# Patient Record
Sex: FEMALE | Race: WHITE
[De-identification: names, ages, dates, MRNs, and addresses within clinical notes are randomized per-mention and may not be internally consistent; named-entity substitution may affect disease eponyms.]

---

## 2019-04-04 ENCOUNTER — HOSPITAL ENCOUNTER (EMERGENCY)
Dept: HOSPITAL 56 - MW.ED | Age: 19
Discharge: HOME | End: 2019-04-04
Payer: COMMERCIAL

## 2019-04-04 VITALS — SYSTOLIC BLOOD PRESSURE: 125 MMHG | DIASTOLIC BLOOD PRESSURE: 75 MMHG

## 2019-04-04 DIAGNOSIS — B34.9: Primary | ICD-10-CM

## 2019-04-04 DIAGNOSIS — Z79.899: ICD-10-CM

## 2019-04-04 NOTE — EDM.PDOC
ED HPI GENERAL MEDICAL PROBLEM





- General


Chief Complaint: Respiratory Problem


Stated Complaint: FLU


Time Seen by Provider: 04/04/19 10:55


Source of Information: Reports: Patient


History Limitations: Reports: No Limitations





- History of Present Illness


INITIAL COMMENTS - FREE TEXT/NARRATIVE: 


HISTORY AND PHYSICAL:





History of present illness:


Patient is a 18-year-old female who presents to the ED today with concern of 

sore throat, nausea, and malaise starting today. Patient states that she is 

approximately 20 weeks pregnant. She states she is here because of concern that 

she needs to get treated for influenza. She has been taking Tylenol for her 

symptoms. She states she also has a slight headache but rates her pain a 2/10 

and describes it as more of a "discomfort." She follows OBGYN care at [facility].





Patient denies fever, chills, shortness of breath, or cough. Denies neck stiff 

ness, change in vision, syncope, or near syncope. Denies vomiting, abdominal 

pain, diarrhea, constipation, or dysuria. Has not noted any blood in urine or 

stool. Patient has been eating and drinking appropriately.





Review of systems: 


As per history of present illness and below otherwise all systems reviewed and 

negative.





Past medical history: 


As per history of present illness and as reviewed below otherwise 

noncontributory.





Surgical history: 


As per history of present illness and as reviewed below otherwise 

noncontributory.





Social history: 


See social history for further information





Family history: 


As per history of present illness and as reviewed below otherwise 

noncontributory.





Physical exam:


General: Patient is alert, oriented, and in no acute distress. She is sitting 

comfortably on exam table.


HEENT: Atraumatic, normocephalic, pupils equal and reactive bilaterally, 

negative for conjunctival pallor or scleral icterus, mucous membranes moist, 

TMs normal bilaterally, throat is mildly erythematous without exudate, neck 

supple, nontender, trachea midline. No drooling or trismus noted. No meningeal 

signs. No hot potato voice noted. 


Lungs: Clear to auscultation, breath sounds equal bilaterally, chest nontender.


Heart: S1S2, regular rate and rhythm without overt murmur


Abdomen: Soft, nondistended, nontender. Negative for masses or 

hepatosplenomegaly. Negative for costovertebral tenderness.


Pelvis: Stable nontender.


Genitourinary: Deferred.


Rectal: Deferred.


Skin: Intact, warm, dry. No lesions or rashes noted.


Extremities: Atraumatic, negative for cords or calf pain. Neurovascular 

unremarkable.


Neuro: Awake, alert, oriented. Cranial nerves II through XII unremarkable. 

Cerebellum unremarkable. Motor and sensory unremarkable throughout. Exam 

nonfocal.





Notes:


Will do labs today.


Labs are unremarkable. Discussed these findings with patient. Discussed the 

importance of follow up with her OBGYN and primary care provider. Supportive 

care measures were reviewed and discussed. Voices understanding and is 

agreeable to plan of care. Denies any further questions or concerns at this 

time.





Diagnostics:


Influenza, strep





Therapeutics:


None





Prescription:


None





Impression: 


1. Viral illness, unspecified





Plan:


1. Continue to use Tylenol as directed for pain and discomfort. This is safe to 

use in pregnancy.


2. Use over-the-counter B6 and Unisom for nausea as this is safe to use in 

pregnancy.


3. Follow up with your OB/GYN and primary care provider as discussed.


4. Return to the ED as needed and as discussed.





Definitive disposition and diagnosis as appropriate pending reevaluation and 

review of above.





  ** Thaot


Pain Score (Numeric/FACES): 6





- Related Data


 Allergies











Allergy/AdvReac Type Severity Reaction Status Date / Time


 


No Known Allergies Allergy   Verified 04/04/19 11:03











Home Meds: 


 Home Meds





Omeprazole 20 mg PO DAILY 04/04/19 [History]











Past Medical History





- Past Health History


Medical/Surgical History: Denies Medical/Surgical History


HEENT History: Reports: Other (See Below)





- Infectious Disease History


Infectious Disease History: Reports: None





- Past Surgical History


Head Surgeries/Procedures: Reports: None





Social & Family History





- Family History


Family Medical History: Noncontributory





- Tobacco Use


Smoking Status *Q: Current Every Day Smoker


Years of Tobacco use: 5


Packs/Tins Daily: 0.2





- Caffeine Use


Caffeine Use: Reports: None





- Recreational Drug Use


Recreational Drug Use: No





ED ROS GENERAL





- Review of Systems


Review Of Systems: ROS reveals no pertinent complaints other than HPI.





ED EXAM, GENERAL





- Physical Exam


Exam: See Below (see dictation)





Course





- Vital Signs


Last Recorded V/S: 


 Last Vital Signs











Temp  37.1 C   04/04/19 11:04


 


Pulse  122 H  04/04/19 11:04


 


Resp  16   04/04/19 11:04


 


BP  127/79   04/04/19 11:04


 


Pulse Ox  98   04/04/19 11:04














- Orders/Labs/Meds


Orders: 


 Active Orders 24 hr











 Category Date Time Status


 


 CULTURE STREP A CONFIRMATION [] Stat Lab  04/04/19 10:55 Results


 


 STREP SCRN A RAPID W CULT CONF [] Stat Lab  04/04/19 10:55 Results














Departure





- Departure


Time of Disposition: 11:46


Disposition: Home, Self-Care 01


Clinical Impression: 


 Viral illness








- Discharge Information


Referrals: 


PCP,None [Primary Care Provider] - 


Forms:  ED Department Discharge


Additional Instructions: 


The following information is given to patients seen in the emergency department 

who are being discharged to home. This information is to outline your options 

for follow-up care. We provide all patients seen in our emergency department 

with a follow-up referral.





The need for follow-up, as well as the timing and circumstances, are variable 

depending upon the specifics of your emergency department visit.





If you don't have a primary care physician on staff, we will provide you with a 

referral. We always advise you to contact your personal physician following an 

emergency department visit to inform them of the circumstance of the visit and 

for follow-up with them and/or the need for any referrals to a consulting 

specialist.





The emergency department will also refer you to a specialist when appropriate. 

This referral assures that you have the opportunity for follow-up care with a 

specialist. All of these measure are taken in an effort to provide you with 

optimal care, which includes your follow-up.





Under all circumstances we always encourage you to contact your private 

physician who remains a resource for coordinating your care. When calling for 

follow-up care, please make the office aware that this follow-up is from your 

recent emergency room visit. If for any reason you are refused follow-up, 

please contact the [facility] Emergency 

Department at [phone] and asked to speak to the emergency department 

charge nurse.





[facility]


Primary Care


[address] [place] [id number]


Phone: [phone]


Fax: [phone]





[address] [address]


Phone: [phone]


Fax: [phone]





1. Continue to use Tylenol as directed for pain and discomfort. This is safe to 

use in pregnancy.


2. Use over-the-counter B6 and Unisom for nausea as this is safe to use in 

pregnancy.


3. Follow up with your OB/GYN and primary care provider as discussed.


4. Return to the ED as needed and as discussed.





 








- My Orders


Last 24 Hours: 


My Active Orders





04/04/19 10:55


CULTURE STREP A CONFIRMATION [RM] Stat 


STREP SCRN A RAPID W CULT CONF [RM] Stat 














- Assessment/Plan


Last 24 Hours: 


My Active Orders





04/04/19 10:55


CULTURE STREP A CONFIRMATION [RM] Stat 


STREP SCRN A RAPID W CULT CONF [RM] Stat

## 2019-04-06 ENCOUNTER — HOSPITAL ENCOUNTER (OUTPATIENT)
Dept: HOSPITAL 56 - MW.OBCHECK | Age: 19
Setting detail: OBSERVATION
LOS: 1 days | Discharge: HOME | End: 2019-04-07
Attending: OBSTETRICS & GYNECOLOGY | Admitting: OBSTETRICS & GYNECOLOGY
Payer: COMMERCIAL

## 2019-04-06 DIAGNOSIS — Z3A.23: ICD-10-CM

## 2019-04-06 DIAGNOSIS — O99.612: Primary | ICD-10-CM

## 2019-04-06 DIAGNOSIS — O99.89: ICD-10-CM

## 2019-04-06 DIAGNOSIS — K52.9: ICD-10-CM

## 2019-04-06 DIAGNOSIS — R79.89: ICD-10-CM

## 2019-04-06 LAB
CHLORIDE SERPL-SCNC: 102 MMOL/L (ref 98–107)
SODIUM SERPL-SCNC: 135 MMOL/L (ref 136–145)

## 2019-04-06 PROCEDURE — 82150 ASSAY OF AMYLASE: CPT

## 2019-04-06 PROCEDURE — 96361 HYDRATE IV INFUSION ADD-ON: CPT

## 2019-04-06 PROCEDURE — 81001 URINALYSIS AUTO W/SCOPE: CPT

## 2019-04-06 PROCEDURE — 85025 COMPLETE CBC W/AUTO DIFF WBC: CPT

## 2019-04-06 PROCEDURE — 96375 TX/PRO/DX INJ NEW DRUG ADDON: CPT

## 2019-04-06 PROCEDURE — 96374 THER/PROPH/DIAG INJ IV PUSH: CPT

## 2019-04-06 PROCEDURE — 59025 FETAL NON-STRESS TEST: CPT

## 2019-04-06 PROCEDURE — 76705 ECHO EXAM OF ABDOMEN: CPT

## 2019-04-06 PROCEDURE — 80074 ACUTE HEPATITIS PANEL: CPT

## 2019-04-06 PROCEDURE — 83690 ASSAY OF LIPASE: CPT

## 2019-04-06 PROCEDURE — G0378 HOSPITAL OBSERVATION PER HR: HCPCS

## 2019-04-06 PROCEDURE — 80053 COMPREHEN METABOLIC PANEL: CPT

## 2019-04-06 PROCEDURE — 36415 COLL VENOUS BLD VENIPUNCTURE: CPT

## 2019-04-07 LAB
CHLORIDE SERPL-SCNC: 107 MMOL/L (ref 98–107)
SODIUM SERPL-SCNC: 139 MMOL/L (ref 136–145)

## 2019-04-07 NOTE — US
INDICATION:



Elevated LFTs in a pregnant patient (22 weeks gestation). 



TECHNIQUE:



Ultrasound abdomen limited. Sonographic images of the right upper quadrant 

were obtained using gray-scale and color Doppler images.



COMPARISON:



None 



FINDINGS:



Liver: Normal in size and echotexture. No masses. No intrahepatic biliary 

dilatation. The main portal vein demonstrates hepatopetal flow.



Gallbladder: No stones or sludge. Normal wall thickness. No pericholecystic 

fluid. Negative sonographic Castro`s sign.



Common bile duct: Normal in caliber measuring 2 mm. 



Pancreas: Visualized portions of the head and body are within normal 

limits. Distal body and tail are obscured by overlying bowel gas. 



Right kidney: Measures 12.7 cm. Normal echotexture and cortex. No masses, 

stones, or hydronephrosis. 



IMPRESSION:



Normal right upper quadrant ultrasound.



Dictated by Justina Gamboa MD @ 4/7/2019 10:12:32 AM



Dictated by: Justina Gamboa MD @ 04/07/2019 10:12:36



(Electronically Signed)

## 2019-08-07 NOTE — PCM.PREANE
Preanesthetic Assessment





- Anesthesia/Transfusion/Family Hx


Anesthesia History: Prior Anesthesia Without Reaction


Family History of Anesthesia Reaction: No


Transfusion History: No Prior Transfusion(s)


Type of Transfusion Reactions: Reports: Unknown





- Review of Systems


General: No Symptoms


Pulmonary: No Symptoms


Cardiovascular: No Symptoms


Gastrointestinal: No Symptoms


Neurological: No Symptoms


Other: Reports: None (Denies any personal or family history of bleeding or 

clotting problems)





- Physical Assessment


Height: 1.71 m


Weight: 101.605 kg


ASA Class: 2


Mental Status: Alert & Oriented x3


Airway Class: Mallampati = 2


Dentition: Reports: Normal Dentition


ROM/Head Extension: Full





- Lab


Values: 





 Laboratory Last Values











WBC  12.05 K/uL (4.0-11.0)  H  08/07/19  02:35    


 


RBC  3.86 M/uL (4.30-5.90)  L  08/07/19  02:35    


 


Hgb  10.8 g/dL (12.0-16.0)  L  08/07/19  02:35    


 


Hct  32.8 % (36.0-46.0)  L  08/07/19  02:35    


 


MCV  85.0 fL (80.0-98.0)   08/07/19  02:35    


 


MCH  28.0 pg (27.0-32.0)   08/07/19  02:35    


 


MCHC  32.9 g/dL (31.0-37.0)   08/07/19  02:35    


 


RDW Std Deviation  42.4 fl (28.0-62.0)   08/07/19  02:35    


 


RDW Coeff of Nicolette  14 % (11.0-15.0)   08/07/19  02:35    


 


Plt Count  218 K/uL (150-400)   08/07/19  02:35    


 


MPV  11.20 fL (7.40-12.00)   08/07/19  02:35    


 


Blood Type  A POSITIVE   08/07/19  02:35    


 


Antibody Screen  NEGATIVE   08/07/19  02:35    














- Allergies


Allergies/Adverse Reactions: 


 Allergies











Allergy/AdvReac Type Severity Reaction Status Date / Time


 


No Known Allergies Allergy   Verified 08/07/19 02:11














- Acknowledgements


Anesthesia Type Planned: Epidural


Pt an Appropriate Candidate for the Planned Anesthesia: Yes


Alternatives and Risks of Anesthesia Discussed w Pt/Guardian: Yes


Pt/Guardian Understands and Agrees with Anesthesia Plan: Yes





PreAnesthesia Questionnaire





- Past Health History


Medical/Surgical History: Denies Medical/Surgical History


HEENT History: Reports: None, Other (See Below)


Cardiovascular History: Reports: None


Respiratory History: Reports: None


Gastrointestinal History: Reports: None


Genitourinary History: Reports: None


OB/GYN History: Reports: Pregnancy


Musculoskeletal History: Reports: None


Neurological History: Reports: None


Psychiatric History: Reports: None


Endocrine/Metabolic History: Reports: None


Hematologic History: Reports: None


Immunologic History: Reports: None


Oncologic (Cancer) History: Reports: None


Dermatologic History: Reports: None





- Infectious Disease History


Infectious Disease History: Reports: Influenza





- Past Surgical History


Head Surgeries/Procedures: Reports: None


HEENT Surgical History: Reports: None


Cardiovascular Surgical History: Reports: None


Respiratory Surgical History: Reports: None


GI Surgical History: Reports: None


Female  Surgical History: Reports: None


Endocrine Surgical History: Reports: None


Neurological Surgical History: Reports: None


Musculoskeletal Surgical History: Reports: None


Oncologic Surgical History: Reports: None





- HOME MEDS


Home Medications: 


 Home Meds





Omeprazole 20 mg PO DAILY 04/04/19 [History]


PNV #116/Iron Fumarate/FA/DHA [Expecta Prenatal Combo Pack] 1 04/06/19 [History]


Ondansetron [Zofran ODT] 4 mg PO Q6H PRN #20 tab.dis 04/07/19 [Rx]


Potassium Citrate [Potassium Citrate ER] 15 meq PO DAILY 3 Days #3 tablet.er 04/ 07/19 [Rx]











- CURRENT (IN HOUSE) MEDS


Current Meds: 





 Current Medications





Butorphanol Tartrate (Stadol)  1 mg IVPUSH Q1H PRN


   PRN Reason: Pain


   Last Admin: 08/07/19 14:44 Dose:  1 mg


Carboprost Tromethamine (Hemabate Ds)  250 mcg IM ASDIRECTED PRN


   PRN Reason: Post Partum Hemorrhage


Lactated Ringer's (Ringers, Lactated)  1,000 mls @ 150 mls/hr IV ASDIRECTED KAREN


   Last Admin: 08/07/19 19:20 Dose:  150 mls/hr


Oxytocin/Sodium Chloride (Oxytocin 30 Unit/500 Ml-Ns)  30 unit in 500 mls @ 999 

mls/hr IV TITRATE KAREN


Oxytocin/Sodium Chloride (Oxytocin 30 Unit/500 Ml-Ns)  30 unit in 500 mls @ 2 

mls/hr IV TITRATE KAREN; Protocol


Tranexamic Acid 1,000 mg/ (Sodium Chloride)  110 mls @ 660 mls/hr IV ONETIME PRN


   PRN Reason: Bleeding


Lidocaine HCl (Xylocaine 1%)  50 ml INJECT ONETIME PRN


   PRN Reason: Laceration repair


Methylergonovine Maleate (Methergine)  0.2 mg IM ASDIRECTED PRN


   PRN Reason: Post Partum Hemorrhage


Misoprostol (Cytotec)  200 mcg PO ONETIME PRN


   PRN Reason: Post Partum Hemorrhage


Misoprostol (Cytotec)  25 mcg VAG ONETIME PRN


   PRN Reason: Cervical Ripening


   Last Admin: 08/07/19 02:39 Dose:  25 mcg


Misoprostol (Cytotec)  25 mcg VAG Q4H PRN


   PRN Reason: Cervical Ripening


   Last Admin: 08/07/19 10:58 Dose:  25 mcg


Nalbuphine HCl (Nubain)  10 mg IVPUSH Q1H PRN


   PRN Reason: Pain (severe 7-10)


Sodium Chloride (Saline Flush)  10 ml FLUSH ASDIRECTED PRN


   PRN Reason: Keep Vein Open


Sodium Chloride (Saline Flush)  2.5 ml FLUSH ASDIRECTED PRN


   PRN Reason: Keep Vein Open


Sodium Chloride (Normal Saline)  10 ml IV ASDIRECTED PRN


   PRN Reason: IV Use


Sterile Water (Sterile Water For Irrigation)  1,000 ml IRR ASDIRECTED PRN


   PRN Reason: delivery


Terbutaline Sulfate (Brethine)  0.25 mg SUBCUT ASDIRECTED PRN


   PRN Reason: Tacysystole





Discontinued Medications





Fentanyl (Sublimaze) Confirm Administered Dose 300 mcg .ROUTE .STK-MED ONE


   Stop: 08/07/19 18:24


Ropivacaine (Naropin 0.2%) Confirm Administered Dose 100 mls @ as directed 

.ROUTE .STK-MED ONE


   Stop: 08/07/19 18:24


Ropivacaine (Naropin 0.2%) Confirm Administered Dose 20 ml .ROUTE .STK-MED ONE


   Stop: 08/07/19 18:23

## 2019-08-08 NOTE — PCM.OPNOTE
- General Post-Op/Procedure Note


Date of Surgery/Procedure: 19


Operative Procedure(s): primary low transverse 


Findings: 





Liveborn male APGAR 8/9 weight pending, thick meconium, normal pelvis


Pre Op Diagnosis: 41 weeks, chorioamnionitis, meconium, fetal intolerance to 

labor.


Post-Op Diagnosis: Same


Anesthesia Technique: Epidural


Primary Surgeon: Pebbles Barrera


Secondary Surgeon: Allie Diaz


Anesthesia Provider: Kevin Ramirez


Assistant: Medina Guzman


Pathology: 





placenta to pathology


Fluid Replacement, Intraop: 1,200


Output, Urine Amount: 100


EBL in mLs: 400


Complications: None Known


Condition: Good

## 2019-08-09 NOTE — [id number]
Post Anesthesia Note





- EVALUATION WITHIN 48HRS OF ANESTHETIC


Vital Signs in Normal Range: Yes


Patient Participated in Evaluation: Yes


Respiratory Function Stable: Yes


Airway Patent: Yes


Cardiovascular Function Stable: Yes


Hydration Status Stable: Yes


Pain Control Satisfactory: Yes


Nausea and Vomiting Control Satisfactory: Yes


Mental Status Recovered: Yes


Resp Rate: 17


Temperature: 37.4 C





- COMMENTS/OBSERVATIONS


Free Text/Narrative:: 





no anesthesia problems

## 2019-08-09 NOTE — PCM.PNPP
- General Info


Date of Service: 19


Subjective Update: 





17yo P1 s/p Primary  POD 1 , breastfeeding , ambulating ,normal lochia 


Functional Status: Reports: Pain Controlled, Tolerating Diet, Ambulating, 

Urinating





- Review of Systems


General: Reports: No Symptoms


HEENT: Reports: No Symptoms


Pulmonary: Reports: No Symptoms


Cardiovascular: Reports: No Symptoms


Gastrointestinal: Reports: No Symptoms


Genitourinary: Reports: No Symptoms


Musculoskeletal: Reports: No Symptoms


Skin: Reports: No Symptoms


Neurological: Reports: No Symptoms


Psychiatric: Reports: No Symptoms





- General Info


Date of Service: 19





- Patient Data


Vital Signs - Most Recent: 


 Last Vital Signs











Temp  36.9 C   19 16:50


 


Pulse  83   19 16:50


 


Resp  18   19 16:50


 


BP  116/58 L  19 16:50


 


Pulse Ox  99   19 16:50











Weight - Most Recent: 101.605 kg


I&O - Last 24 Hours: 


 Intake & Output











 19





 06:59 14:59 22:59


 


Intake Total 50  


 


Balance 50  











Lab Results - Last 24 Hours: 


 Laboratory Results - last 24 hr











  19 Range/Units





  06:22 


 


WBC  13.78 H  (4.0-11.0)  K/uL


 


RBC  3.53 L  (4.30-5.90)  M/uL


 


Hgb  9.5 L  (12.0-16.0)  g/dL


 


Hct  30.0 L  (36.0-46.0)  %


 


MCV  85.0  (80.0-98.0)  fL


 


MCH  26.9 L  (27.0-32.0)  pg


 


MCHC  31.7  (31.0-37.0)  g/dL


 


RDW Std Deviation  45.6  (28.0-62.0)  fl


 


RDW Coeff of Nicolette  15  (11.0-15.0)  %


 


Plt Count  185  (150-400)  K/uL


 


MPV  10.60  (7.40-12.00)  fL


 


Neut % (Auto)  73.4  (48.0-80.0)  %


 


Lymph % (Auto)  15.5 L  (16.0-40.0)  %


 


Mono % (Auto)  10.1  (0.0-15.0)  %


 


Eos % (Auto)  0.9  (0.0-7.0)  %


 


Baso % (Auto)  0.1  (0.0-1.5)  %


 


Neut # (Auto)  10.1 H  (1.4-5.7)  K/uL


 


Lymph # (Auto)  2.1  (0.6-2.4)  K/uL


 


Mono # (Auto)  1.4 H  (0.0-0.8)  K/uL


 


Eos # (Auto)  0.1  (0.0-0.7)  K/uL


 


Baso # (Auto)  0.0  (0.0-0.1)  K/uL


 


Nucleated RBC %  0.0  /100WBC


 


Nucleated RBCs #  0  K/uL











Med Orders - Current: 


 Current Medications





Bisacodyl (Dulcolax)  10 mg RECTAL ONETIME PRN


   PRN Reason: Constipation


Citric Acid/Sodium Citrate (Bicitra Solution)  30 ml PO QIDACANDBED PRN


   PRN Reason: Indigestion


Diphenhydramine HCl (Benadryl)  25 mg IVPUSH Q6H PRN


   PRN Reason: Itching or Nausea


Docusate Sodium (Colace)  100 mg PO BID K[facility]


   Last Admin: 19 08:21 Dose:  100 mg


Emollient Ointment (Lansinoh Hpa)  0 gm TOP ASDIRECTED PRN


   PRN Reason: Sore Nipples


   Last Admin: 19 11:18 Dose:  1 tube


Famotidine (Pepcid)  20 mg IVPUSH BID PRN


   PRN Reason: Indigestion


   Last Admin: 19 17:08 Dose:  20 mg


Fentanyl (Sublimaze)  50 mcg IVPUSH Q1H PRN


   PRN Reason: Pain (severe 7-10)


Lactated Ringer's (Ringers, Lactated)  1,000 mls @ 125 mls/hr IV ASDIRECTED K[facility]EN


Ibuprofen (Motrin)  800 mg PO Q8H PRN


   PRN Reason: mild pain or fever


Methylergonovine Maleate (Methergine)  0.2 mg IM ONETIME PRN


   PRN Reason: Excessive Vaginal Bleeding


Nalbuphine HCl (Nubain)  5 mg IVPUSH ASDIRECTED PRN


   PRN Reason: Itching


Ondansetron HCl (Zofran)  4 mg IVPUSH Q4H PRN


   PRN Reason: Nausea/Vomiting


Ondansetron HCl (Zofran)  4 mg IVPUSH Q6H PRN


   PRN Reason: Nausea


Oxycodone/Acetaminophen (Percocet 325-5 Mg)  1 tab PO Q4H PRN


   PRN Reason: Pain (moderate 4-6)


Oxycodone/Acetaminophen (Percocet 325-5 Mg)  2 tab PO Q4H PRN


   PRN Reason: Pain (moderate 4-6)


   Last Admin: 19 17:35 Dose:  2 tab


Oxycodone/Acetaminophen (Percocet 325-5 Mg)  2 tab PO Q6H PRN


   PRN Reason: Pain (moderate 4-6)





Discontinued Medications





Acetaminophen (Tylenol Extra Strength)  1,000 mg PO ONETIME ONE


   Stop: 19 23:28


   Last Admin: 19 23:37 Dose:  1,000 mg


Bupivacaine HCl (Sensorcaine-Mpf 0.5%) Confirm Administered Dose 10 ml .ROUTE 

.STK-MED ONE


   Stop: 19 04:03


   Last Admin: 19 04:48 Dose:  Not Given


Bupivacaine HCl (Marcaine 0.5%) Confirm Administered Dose 30 ml .ROUTE .STK-MED 

ONE


   Stop: 19 06:39


Butorphanol Tartrate (Stadol)  1 mg IVPUSH Q1H PRN


   PRN Reason: Pain


   Last Admin: 19 14:44 Dose:  1 mg


Carboprost Tromethamine (Hemabate Ds)  250 mcg IM ASDIRECTED PRN


   PRN Reason: Post Partum Hemorrhage


Cefazolin Sodium (Ancef) Confirm Administered Dose 2 gm .ROUTE .STK-MED ONE


   Stop: 19 06:54


Citric Acid/Sodium Citrate (Bicitra Solution)  30 ml PO ONETIME ONE


   Stop: 19 22:18


   Last Admin: 19 22:24 Dose:  30 ml


Diphenhydramine HCl (Benadryl)  25 mg IVPUSH Q4H PRN


   PRN Reason: Itching


   Stop: 19 08:04


Fentanyl (Sublimaze) Confirm Administered Dose 300 mcg .ROUTE .STK-MED ONE


   Stop: 19 18:24


   Last Admin: 19 19:33 Dose:  Not Given


Fentanyl (Sublimaze) Confirm Administered Dose 300 mcg .ROUTE .STK-MED ONE


   Stop: 19 04:01


   Last Admin: 19 04:47 Dose:  Not Given


Lactated Ringer's (Ringers, Lactated)  1,000 mls @ 150 mls/hr IV ASDIRECTED [facility]


   Last Admin: 19 06:17 Dose:  999 mls/hr


Oxytocin/Sodium Chloride (Oxytocin 30 Unit/500 Ml-Ns)  30 unit in 500 mls @ 999 

mls/hr IV TITRATE KAREN


Oxytocin/Sodium Chloride (Oxytocin 30 Unit/500 Ml-Ns)  30 unit in 500 mls @ 2 

mls/hr IV TITRATE KAREN; Protocol


   Last Titration: 19 06:04 Dose:  0 munits/min, 0 mls/hr


Tranexamic Acid 1,000 mg/ (Sodium Chloride)  110 mls @ 660 mls/hr IV ONETIME PRN


   PRN Reason: Bleeding


Ropivacaine (Naropin 0.2%) Confirm Administered Dose 100 mls @ as directed 

.ROUTE .ST-MED ONE


   Stop: 19 18:24


   Last Admin: 19 19:33 Dose:  Not Given


Ampicillin Sodium/Sulbactam (Sodium 3 gm/ Sodium Chloride)  100 mls @ 200 mls/

hr IV ONETIME ONE


   Stop: 19 23:56


   Last Admin: 19 23:37 Dose:  200 mls/hr


Ampicillin Sodium/Sulbactam (Sodium 1.5 gm/ Sodium Chloride)  50 mls @ 150 mls/

hr IV Q6H [facility]


   Last Admin: 19 05:51 Dose:  150 mls/hr


Ropivacaine (Naropin 0.2%) Confirm Administered Dose 100 mls @ as directed 

.ROUTE .ST-MED ONE


   Stop: 19 04:02


   Last Admin: 19 04:48 Dose:  Not Given


Sodium Chloride (Normal Saline) Confirm Administered Dose 20 mls @ as directed 

.ROUTE .ST-MED ONE


   Stop: 19 06:54


Ampicillin Sodium/Sulbactam (Sodium 1.5 gm/ Sodium Chloride)  50 mls @ 150 mls/

hr IV Q6H [facility]


   Stop: 19 09:00


   Last Admin: 19 11:15 Dose:  150 mls/hr


Ampicillin Sodium/Sulbactam (Sodium 1.5 gm/ Sodium Chloride)  50 mls @ 150 mls/

hr IV Q6H [facility]


   Last Admin: 19 11:22 Dose:  150 mls/hr


Ketorolac Tromethamine (Toradol)  30 mg IVPUSH Q6H [facility]


   Stop: 19 07:31


   Last Admin: 19 08:21 Dose:  30 mg


Lidocaine HCl (Xylocaine 1%)  50 ml INJECT ONETIME PRN


   PRN Reason: Laceration repair


Methylergonovine Maleate (Methergine)  0.2 mg IM ASDIRECTED PRN


   PRN Reason: Post Partum Hemorrhage


Misoprostol (Cytotec)  200 mcg PO ONETIME PRN


   PRN Reason: Post Partum Hemorrhage


Misoprostol (Cytotec)  25 mcg VAG ONETIME PRN


   PRN Reason: Cervical Ripening


   Last Admin: 19 02:39 Dose:  25 mcg


Misoprostol (Cytotec)  25 mcg VAG Q4H PRN


   PRN Reason: Cervical Ripening


   Last Admin: 19 10:58 Dose:  25 mcg


Morphine Sulfate (Duramorph Pf) Confirm Administered Dose 10 mg .ROUTE .STK-MED 

ONE


   Stop: 19 06:44


Nalbuphine HCl (Nubain)  10 mg IVPUSH Q1H PRN


   PRN Reason: Pain (severe 7-10)


Naloxone HCl (Narcan)  0.1 mg IVPUSH ONETIME PRN


   PRN Reason: Respiratory Depression


   Stop: 19 08:04


Octyl Cyanoacrylate (Dermabond Advance) Confirm Administered Dose 1 applic 

.ROUTE .STK-MED ONE


   Stop: 19 07:37


   Last Admin: 19 07:26 Dose:  Not Given


Ondansetron HCl (Zofran) Confirm Administered Dose 4 mg .ROUTE .STK-MED ONE


   Stop: 19 09:25


Oxytocin (Pitocin) Confirm Administered Dose 20 unit .ROUTE .STK-MED ONE


   Stop: 19 09:25


Phenylephrine HCl (Phenylephrine In Ns 100 Mcg/Ml) Confirm Administered Dose 1 

mg .ROUTE .STK-MED ONE


   Stop: 19 09:25


Ropivacaine (Naropin 0.2%) Confirm Administered Dose 20 ml .ROUTE .STK-MED ONE


   Stop: 19 18:23


   Last Admin: 19 19:33 Dose:  Not Given


Sodium Chloride (Saline Flush)  10 ml FLUSH ASDIRECTED PRN


   PRN Reason: Keep Vein Open


Sodium Chloride (Saline Flush)  2.5 ml FLUSH ASDIRECTED PRN


   PRN Reason: Keep Vein Open


Sodium Chloride (Normal Saline)  10 ml IV ASDIRECTED PRN


   PRN Reason: IV Use


Sterile Water (Sterile Water For Irrigation)  1,000 ml IRR ASDIRECTED PRN


   PRN Reason: delivery


Terbutaline Sulfate (Brethine)  0.25 mg SUBCUT ASDIRECTED PRN


   PRN Reason: Tacysystole


   Last Admin: 19 06:27 Dose:  0.25 mg











- Infant Interaction


Support Person: Mother





- Postpartum Recovery Exam


Fundal Tone: Firm


Fundal Level: 1 Fingerbreadths Below Umbilicus


Fundal Placement: Midline


Lochia Amount: Scant


Lochia Color: Rubra/Red


Perineum Description: Intact, Minimal Bruising/Swelling


Episiotomy/Laceration: None


Bladder Status: Voiding


Urinary Elimination: Voided





- Exam


General: Alert


HEENT: Pupils Equal


Neck: Supple


Lungs: Clear to Auscultation


Cardiovascular: Regular Rate, Regular Rhythm


GI/Abdominal Exam: Normal Bowel Sounds


Extremities: Normal Inspection


Wound/Incisions: Healing Well, Dressing Dry and Intact


Neurological: No New Focal Deficit


Psy/Mental Status: Alert





- Problem List & Annotations


(1)  delivery delivered


SNOMED Code(s): 138849198


   Code(s): O82 - ENCOUNTER FOR  DELIVERY WITHOUT INDICATION   Status: 

Acute   Current Visit: Yes   





- Problem List Review


Problem List Initiated/Reviewed/Updated: Yes





- Assessment


Assessment:: 


17yo P1 s/p Primary LTCS for AOD, POD1 , stable , Normal lochia 








- Plan


Plan:: 


Regular diet


Incentive spirometry


Pain control as needed


Venodynes


Encourage ambulation

## 2019-08-10 NOTE — PCM.PNPP
- General Info


Date of Service: 08/10/19


Functional Status: Reports: Pain Controlled, Tolerating Diet, Ambulating, 

Urinating, Other (passing flatus, minimal lochia)





- Review of Systems


General: Reports: No Symptoms


HEENT: Reports: No Symptoms


Pulmonary: Reports: No Symptoms


Cardiovascular: Reports: No Symptoms


Gastrointestinal: Reports: Abdominal Pain


Genitourinary: Reports: No Symptoms


Musculoskeletal: Reports: No Symptoms


Skin: Reports: No Symptoms


Neurological: Reports: No Symptoms


Psychiatric: Reports: No Symptoms





- Patient Data


Vital Signs - Most Recent: 


 Last Vital Signs











Temp  36.3 C   08/10/19 04:10


 


Pulse  78   08/10/19 04:10


 


Resp  17   08/10/19 04:10


 


BP  118/60   08/10/19 04:10


 


Pulse Ox  97   08/10/19 04:10











Weight - Most Recent: 101.605 kg


Med Orders - Current: 


 Current Medications





Bisacodyl (Dulcolax)  10 mg RECTAL ONETIME PRN


   PRN Reason: Constipation


Citric Acid/Sodium Citrate (Bicitra Solution)  30 ml PO QIDACANDBED PRN


   PRN Reason: Indigestion


Diphenhydramine HCl (Benadryl)  25 mg IVPUSH Q6H PRN


   PRN Reason: Itching or Nausea


Docusate Sodium (Colace)  100 mg PO BID [facility]


   Last Admin: 19 23:12 Dose:  100 mg


Emollient Ointment (Lansinoh Hpa)  0 gm TOP ASDIRECTED PRN


   PRN Reason: Sore Nipples


   Last Admin: 19 11:18 Dose:  1 tube


Famotidine (Pepcid)  20 mg IVPUSH BID PRN


   PRN Reason: Indigestion


   Last Admin: 19 17:08 Dose:  20 mg


Fentanyl (Sublimaze)  50 mcg IVPUSH Q1H PRN


   PRN Reason: Pain (severe 7-10)


Lactated Ringer's (Ringers, Lactated)  1,000 mls @ 125 mls/hr IV ASDIRECTED K[facility]EN


Ibuprofen (Motrin)  800 mg PO Q8H PRN


   PRN Reason: mild pain or fever


   Last Admin: 19 23:12 Dose:  800 mg


Methylergonovine Maleate (Methergine)  0.2 mg IM ONETIME PRN


   PRN Reason: Excessive Vaginal Bleeding


Nalbuphine HCl (Nubain)  5 mg IVPUSH ASDIRECTED PRN


   PRN Reason: Itching


Ondansetron HCl (Zofran)  4 mg IVPUSH Q4H PRN


   PRN Reason: Nausea/Vomiting


Ondansetron HCl (Zofran)  4 mg IVPUSH Q6H PRN


   PRN Reason: Nausea


Oxycodone/Acetaminophen (Percocet 325-5 Mg)  1 tab PO Q4H PRN


   PRN Reason: Pain (moderate 4-6)


   Last Admin: 08/10/19 05:14 Dose:  1 tab


Oxycodone/Acetaminophen (Percocet 325-5 Mg)  2 tab PO Q4H PRN


   PRN Reason: Pain (moderate 4-6)


   Last Admin: 19 17:35 Dose:  2 tab


Oxycodone/Acetaminophen (Percocet 325-5 Mg)  2 tab PO Q6H PRN


   PRN Reason: Pain (moderate 4-6)





Discontinued Medications





Acetaminophen (Tylenol Extra Strength)  1,000 mg PO ONETIME ONE


   Stop: 19 23:28


   Last Admin: 19 23:37 Dose:  1,000 mg


Bupivacaine HCl (Sensorcaine-Mpf 0.5%) Confirm Administered Dose 10 ml .ROUTE 

.STK-MED ONE


   Stop: 19 04:03


   Last Admin: 19 04:48 Dose:  Not Given


Bupivacaine HCl (Marcaine 0.5%) Confirm Administered Dose 30 ml .ROUTE .STK-MED 

ONE


   Stop: 19 06:39


Butorphanol Tartrate (Stadol)  1 mg IVPUSH Q1H PRN


   PRN Reason: Pain


   Last Admin: 19 14:44 Dose:  1 mg


Carboprost Tromethamine (Hemabate Ds)  250 mcg IM ASDIRECTED PRN


   PRN Reason: Post Partum Hemorrhage


Cefazolin Sodium (Ancef) Confirm Administered Dose 2 gm .ROUTE .STK-MED ONE


   Stop: 19 06:54


Citric Acid/Sodium Citrate (Bicitra Solution)  30 ml PO ONETIME ONE


   Stop: 19 22:18


   Last Admin: 19 22:24 Dose:  30 ml


Diphenhydramine HCl (Benadryl)  25 mg IVPUSH Q4H PRN


   PRN Reason: Itching


   Stop: 19 08:04


Fentanyl (Sublimaze) Confirm Administered Dose 300 mcg .ROUTE .STK-MED ONE


   Stop: 19 18:24


   Last Admin: 19 19:33 Dose:  Not Given


Fentanyl (Sublimaze) Confirm Administered Dose 300 mcg .ROUTE .STK-MED ONE


   Stop: 19 04:01


   Last Admin: 19 04:47 Dose:  Not Given


Lactated Ringer's (Ringers, Lactated)  1,000 mls @ 150 mls/hr IV ASDIRECTED [facility]


   Last Admin: 19 06:17 Dose:  999 mls/hr


Oxytocin/Sodium Chloride (Oxytocin 30 Unit/500 Ml-Ns)  30 unit in 500 mls @ 999 

mls/hr IV TITRATE KAREN


Oxytocin/Sodium Chloride (Oxytocin 30 Unit/500 Ml-Ns)  30 unit in 500 mls @ 2 

mls/hr IV TITRATE KAREN; Protocol


   Last Titration: 19 06:04 Dose:  0 munits/min, 0 mls/hr


Tranexamic Acid 1,000 mg/ (Sodium Chloride)  110 mls @ 660 mls/hr IV ONETIME PRN


   PRN Reason: Bleeding


Ropivacaine (Naropin 0.2%) Confirm Administered Dose 100 mls @ as directed 

.ROUTE .STK-MED ONE


   Stop: 19 18:24


   Last Admin: 19 19:33 Dose:  Not Given


Ampicillin Sodium/Sulbactam (Sodium 3 gm/ Sodium Chloride)  100 mls @ 200 mls/

hr IV ONETIME ONE


   Stop: 19 23:56


   Last Admin: 19 23:37 Dose:  200 mls/hr


Ampicillin Sodium/Sulbactam (Sodium 1.5 gm/ Sodium Chloride)  50 mls @ 150 mls/

hr IV Q6H [facility]


   Last Admin: 19 05:51 Dose:  150 mls/hr


Ropivacaine (Naropin 0.2%) Confirm Administered Dose 100 mls @ as directed 

.ROUTE .STK-MED ONE


   Stop: 19 04:02


   Last Admin: 19 04:48 Dose:  Not Given


Sodium Chloride (Normal Saline) Confirm Administered Dose 20 mls @ as directed 

.ROUTE .STK-MED ONE


   Stop: 19 06:54


Ampicillin Sodium/Sulbactam (Sodium 1.5 gm/ Sodium Chloride)  50 mls @ 150 mls/

hr IV Q6H [facility]


   Stop: 19 09:00


   Last Admin: 19 11:15 Dose:  150 mls/hr


Ampicillin Sodium/Sulbactam (Sodium 1.5 gm/ Sodium Chloride)  50 mls @ 150 mls/

hr IV Q6H [facility]


   Last Admin: 19 11:22 Dose:  150 mls/hr


Ketorolac Tromethamine (Toradol)  30 mg IVPUSH Q6H [facility]


   Stop: 19 07:31


   Last Admin: 19 08:21 Dose:  30 mg


Lidocaine HCl (Xylocaine 1%)  50 ml INJECT ONETIME PRN


   PRN Reason: Laceration repair


Methylergonovine Maleate (Methergine)  0.2 mg IM ASDIRECTED PRN


   PRN Reason: Post Partum Hemorrhage


Misoprostol (Cytotec)  200 mcg PO ONETIME PRN


   PRN Reason: Post Partum Hemorrhage


Misoprostol (Cytotec)  25 mcg VAG ONETIME PRN


   PRN Reason: Cervical Ripening


   Last Admin: 19 02:39 Dose:  25 mcg


Misoprostol (Cytotec)  25 mcg VAG Q4H PRN


   PRN Reason: Cervical Ripening


   Last Admin: 19 10:58 Dose:  25 mcg


Morphine Sulfate (Duramorph Pf) Confirm Administered Dose 10 mg .ROUTE .STK-MED 

ONE


   Stop: 19 06:44


Nalbuphine HCl (Nubain)  10 mg IVPUSH Q1H PRN


   PRN Reason: Pain (severe 7-10)


Naloxone HCl (Narcan)  0.1 mg IVPUSH ONETIME PRN


   PRN Reason: Respiratory Depression


   Stop: 19 08:04


Octyl Cyanoacrylate (Dermabond Advance) Confirm Administered Dose 1 applic 

.ROUTE .STK-MED ONE


   Stop: 19 07:37


   Last Admin: 19 07:26 Dose:  Not Given


Ondansetron HCl (Zofran) Confirm Administered Dose 4 mg .ROUTE .STK-MED ONE


   Stop: 19 09:25


Oxytocin (Pitocin) Confirm Administered Dose 20 unit .ROUTE .STK-MED ONE


   Stop: 19 09:25


Phenylephrine HCl (Phenylephrine In Ns 100 Mcg/Ml) Confirm Administered Dose 1 

mg .ROUTE .STK-MED ONE


   Stop: 19 09:25


Ropivacaine (Naropin 0.2%) Confirm Administered Dose 20 ml .ROUTE .STK-MED ONE


   Stop: 19 18:23


   Last Admin: 19 19:33 Dose:  Not Given


Sodium Chloride (Saline Flush)  10 ml FLUSH ASDIRECTED PRN


   PRN Reason: Keep Vein Open


Sodium Chloride (Saline Flush)  2.5 ml FLUSH ASDIRECTED PRN


   PRN Reason: Keep Vein Open


Sodium Chloride (Normal Saline)  10 ml IV ASDIRECTED PRN


   PRN Reason: IV Use


Sterile Water (Sterile Water For Irrigation)  1,000 ml IRR ASDIRECTED PRN


   PRN Reason: delivery


Terbutaline Sulfate (Brethine)  0.25 mg SUBCUT ASDIRECTED PRN


   PRN Reason: Tacysystole


   Last Admin: 19 06:27 Dose:  0.25 mg











- Infant Interaction


Infant Disposition, Postpartum:  in Room with Family


Infant Interaction: Holding Infant


Infant Feeding:  Infant; Nursed Well


Support Person: Mother





- Postpartum Recovery Exam


Fundal Tone: Firm


Fundal Level: 2 Fingerbreadths Below Umbilicus


Fundal Placement: Midline


Lochia Amount: Scant


Lochia Color: Rubra/Red


Bladder Status: Voiding


Urinary Elimination: Voided





- Exam


General: Alert, Oriented, Cooperative, No Acute Distress


Lungs: Clear to Auscultation, Normal Respiratory Effort


Cardiovascular: Regular Rate, Regular Rhythm, No Murmurs


GI/Abdominal Exam: Soft, No Distention, Tender


Extremities: Normal Inspection, Non-Tender, No Pedal Edema


Skin: Warm, Dry, Intact


Wound/Incisions: No Drainage


Psy/Mental Status: Alert, Normal Affect, Normal Mood





- Problem List & Annotations


(1)  delivery delivered


SNOMED Code(s): 689172469


   Code(s): O82 - ENCOUNTER FOR  DELIVERY WITHOUT INDICATION   Status: 

Acute   Current Visit: Yes   





- Problem List Review


Problem List Initiated/Reviewed/Updated: Yes





- My Orders


Last 24 Hours: 


My Active Orders





08/10/19 09:01


Ready for Discharge [RC] PER UNIT ROUTINE 














- Assessment


Assessment:: 


17yo P1 s/p Primary LTCS for AOD, POD2 , stable , Normal lochia 








- Plan


Plan:: 


Regular diet


Incentive spirometry


Pain control as needed


Encourage ambulation 


Discharge home

## 2019-09-23 NOTE — EDM.PDOC
<Sondra Duque - Last Filed: 19 04:30>





ED HPI GENERAL MEDICAL PROBLEM





- General


Chief Complaint: Chest Pain


Stated Complaint: CHEST PAIN


Time Seen by Provider: 19 03:35





- History of Present Illness


INITIAL COMMENTS - FREE TEXT/NARRATIVE: 





HISTORY AND PHYSICAL:





History of present illness:


The patient is a 18-year-old female who is status post  on  

for an uncomplicated pregnancy without any hypertension or diabetes but was 

postdates at 41 weeks, who presents to the ED stating she has a history of GERD/

acid reflux which sometimes bothers her in the middle the night which she was 

sleeping and awoke with diffuse anterior chest pain radiating to her back and 

her epigastric area associate with shortness of breath. She had nausea at home 

is currently not nauseated and had no vomiting and she also has epigastric 

abdominal pain. She says she has a history of anxiety but is not taking 

medication for that and has no cardiac history. Through her pregnancy she had 

no issues with chest pain or hypertension but did have increasing symptoms of 

GERD and acid reflux and she says that even postdelivery she has been having 

issues with it. She has no specific food intolerance and she's never had 

gallbladder issues. Other than this  she has no abdominal surgical 

history. The patient says that when she woke up with the symptoms it scared her 

and she came immediately here and did not try anything over-the-counter at 

home. Currently she doesn't feel very short of breath but she has this vague 

diffuse discomfort over the entire anterior chest radiating to the back in the 

epigastrium but it is not radiating to her neck or arms. She has no lower 

abdominal pain and has had no urinary issues or diarrhea. She has no flank pain.





Review of systems: 


As per history of present illness and below otherwise all systems reviewed and 

negative.





Past medical history: 


As per history of present illness and as reviewed below otherwise 

noncontributory.





Surgical history: 


As per history of present illness and as reviewed below otherwise 

noncontributory.





Social history: 


No reported history of drug or alcohol abuse.





Family history: 


As per history of present illness and as reviewed below otherwise 

noncontributory.





Physical exam:


General: Well-developed well-nourished overweight female who is nontoxic and 

seems somewhat anxious on my evaluation. Vital signs are noted by me


HEENT: Atraumatic, normocephalic, pupils reactive, negative for conjunctival 

pallor or scleral icterus, mucous membranes moist, throat clear, neck supple, 

nontender, trachea midline.


Lungs: Clear to auscultation, breath sounds equal bilaterally, chest nontender. 

No work of breathing or wheezing or stridor and no evidence of defects or 

deformities of the anterior chest wall.


Heart: S1S2, regular rate and rhythm no overt murmurs, negative for clicks, rubs

, or JVD.


Abdomen: Soft, nondistended, without epigastric tenderness on deep palpation no 

right upper or left upper quadrant tenderness and the remainder the abdomen is 

nontender. Bowel sounds are hypoactive Negative for masses or 

hepatosplenomegaly. Negative for costovertebral tenderness.


Pelvis: Stable nontender.


Genitourinary: Deferred.


Rectal: Deferred.


Extremities: Atraumatic, negative for cords or calf pain. Neurovascular 

unremarkable. No pedal edema or leg asymmetry


Neuro: Awake, alert, oriented. Cranial nerves II through XII unremarkable. 

Cerebellum unremarkable. Motor and sensory unremarkable throughout. Exam 

nonfocal.





Diagnostics:


EKG CBC CMP amylase lipase troponin BNP chest abdominal x-rays





Therapeutics:


IV O2 monitor IV fluids Zofran and Protonix Toradol Morphine 





Patient is saying that she still is having this all over chest pain and now she 

is saying all over upper abdominal pain and she is very vague about describing 

it. She is not short of breath and she no longer has any nausea. I discussed 

with her doing further testing as she is postpartum and doing a CTA of the 

chest and CT of abdomen and pelvis and she wants to have answers so she is 

agreeable. I will give her a dose of morphine for pain management. She still 

looks somewhat anxious about symptoms.


0500: Case endorsed to Dr. Lopez to follow-up chest x-ray and CT scan results 

and disposition patient pending those results





Impression: 


Anterior atypical chest pain and upper abdominal pain, postpartum





Definitive disposition and diagnosis as appropriate pending reevaluation and 

review of above.


Treatments PTA: Reports: EKG


  ** chest


Pain Score (Numeric/FACES): 9





- Related Data


 Allergies











Allergy/AdvReac Type Severity Reaction Status Date / Time


 


No Known Allergies Allergy   Verified 19 03:39











Home Meds: 


 Home Meds





Omeprazole 40 mg PO DAILY 19 [History]











Past Medical History





- Past Health History


Medical/Surgical History: Denies Medical/Surgical History


HEENT History: Reports: None, Other (See Below)


Cardiovascular History: Reports: None


Respiratory History: Reports: None


Gastrointestinal History: Reports: None


Genitourinary History: Reports: None


OB/GYN History: Reports: Pregnancy


Musculoskeletal History: Reports: None


Neurological History: Reports: None


Psychiatric History: Reports: None


Endocrine/Metabolic History: Reports: None


Hematologic History: Reports: None


Immunologic History: Reports: None


Oncologic (Cancer) History: Reports: None


Dermatologic History: Reports: None





- Infectious Disease History


Infectious Disease History: Reports: Influenza





- Past Surgical History


Head Surgeries/Procedures: Reports: None


HEENT Surgical History: Reports: None


Cardiovascular Surgical History: Reports: None


Respiratory Surgical History: Reports: None


GI Surgical History: Reports: None


Female  Surgical History: Reports: None


Endocrine Surgical History: Reports: None


Neurological Surgical History: Reports: None


Musculoskeletal Surgical History: Reports: None


Oncologic Surgical History: Reports: None





Social & Family History





- Family History


Family Medical History: Noncontributory


HEENT: Reports: None


Cardiac: Reports: None


Respiratory: Reports: None


GI: Reports: None


: Reports: None


OBGYN: Reports: None


Musculoskeletal: Reports: None


Neurological: Reports: None


Psychiatric: Reports: None


Endocrine/Metabolic: Reports: None


Hematologic: Reports: None


Immunologic: Reports: None


Dermatologic: Reports: None


Oncologic: Reports: None





- Caffeine Use


Caffeine Use: Reports: Coffee, Soda





ED ROS GENERAL





- Review of Systems


Review Of Systems: ROS reveals no pertinent complaints other than HPI.





ED EXAM, GENERAL





- Physical Exam


Exam: See Below (See dictation)





Course





- Vital Signs


Last Recorded V/S: 





 Last Vital Signs











Temp  36.0 C   19 03:37


 


Pulse  50 L  19 04:20


 


Resp  18   19 04:20


 


BP  129/70   19 04:20


 


Pulse Ox  100   19 04:20














- Orders/Labs/Meds


Orders: 





 Active Orders 24 hr











 Category Date Time Status


 


 Cardiac Monitoring [RC] .AS DIRECTED Care  19 03:40 Active


 


 EKG Documentation Completion [RC] STAT Care  19 03:40 Active


 


 Oxygen Therapy, ED [RC] ASDIRECTED Care  19 03:40 Active


 


 Pulse Oximetry [RC] ASDIRECTED Care  19 03:40 Active


 


 Sodium Chloride 0.9% [Saline Flush] Med  19 03:41 Active





 10 ml FLUSH ASDIRECTED PRN   


 


 Sodium Chloride 0.9% [Saline Flush] Med  19 03:41 Active





 2.5 ml FLUSH ASDIRECTED PRN   


 


 Saline Lock Insert [OM.PC] Stat Oth  19 03:40 Ordered








 Medication Orders





Sodium Chloride (Saline Flush)  10 ml FLUSH ASDIRECTED PRN


   PRN Reason: Keep Vein Open


Sodium Chloride (Saline Flush)  2.5 ml FLUSH ASDIRECTED PRN


   PRN Reason: Keep Vein Open








Labs: 





 Laboratory Tests











  19 Range/Units





  03:45 03:45 03:45 


 


WBC  9.39    (4.0-11.0)  K/uL


 


RBC  4.82    (4.30-5.90)  M/uL


 


Hgb  13.0    (12.0-16.0)  g/dL


 


Hct  40.5    (36.0-46.0)  %


 


MCV  84.0    (80.0-98.0)  fL


 


MCH  27.0    (27.0-32.0)  pg


 


MCHC  32.1    (31.0-37.0)  g/dL


 


RDW Std Deviation  47.6    (28.0-62.0)  fl


 


RDW Coeff of Nicolette  15    (11.0-15.0)  %


 


Plt Count  334    (150-400)  K/uL


 


MPV  9.70    (7.40-12.00)  fL


 


Neut % (Auto)  44.7 L    (48.0-80.0)  %


 


Lymph % (Auto)  42.2 H    (16.0-40.0)  %


 


Mono % (Auto)  8.4    (0.0-15.0)  %


 


Eos % (Auto)  4.3    (0.0-7.0)  %


 


Baso % (Auto)  0.4    (0.0-1.5)  %


 


Neut # (Auto)  4.2    (1.4-5.7)  K/uL


 


Lymph # (Auto)  4.0 H    (0.6-2.4)  K/uL


 


Mono # (Auto)  0.8    (0.0-0.8)  K/uL


 


Eos # (Auto)  0.4    (0.0-0.7)  K/uL


 


Baso # (Auto)  0.0    (0.0-0.1)  K/uL


 


Nucleated RBC %  0.0    /100WBC


 


Nucleated RBCs #  0    K/uL


 


Sodium   141   (136-145)  mmol/L


 


Potassium   3.6   (3.5-5.1)  mmol/L


 


Chloride   107   ()  mmol/L


 


Carbon Dioxide   23.4   (21.0-32.0)  mmol/L


 


BUN   12   (7.0-18.0)  mg/dL


 


Creatinine   0.9   (0.6-1.0)  mg/dL


 


Est Cr Clr Drug Dosing   97.77   mL/min


 


Estimated GFR (MDRD)   > 60.0   ml/min


 


Glucose   89   ()  mg/dL


 


Calcium   9.1   (8.5-10.1)  mg/dL


 


Total Bilirubin   0.2   (0.2-1.0)  mg/dL


 


AST   18   (15-37)  IU/L


 


ALT   28   (14-63)  IU/L


 


Alkaline Phosphatase   78   ()  U/L


 


Troponin I   < 0.050   (0.000-0.056)  ng/mL


 


B-Natriuretic Peptide    17  (<100)  PG/ML


 


Total Protein   7.6   (6.4-8.2)  g/dL


 


Albumin   3.7   (3.4-5.0)  g/dL


 


Globulin   3.9   (2.6-4.0)  g/dL


 


Albumin/Globulin Ratio   0.9   (0.9-1.6)  


 


Amylase   46   ()  U/L


 


Lipase   233   ()  U/L











Meds: 





Medications











Generic Name Dose Route Start Last Admin





  Trade Name Freq  PRN Reason Stop Dose Admin


 


Sodium Chloride  10 ml  19 03:41  





  Saline Flush  FLUSH   





  ASDIRECTED PRN   





  Keep Vein Open   





     





     





     


 


Sodium Chloride  2.5 ml  19 03:41  





  Saline Flush  FLUSH   





  ASDIRECTED PRN   





  Keep Vein Open   





     





     





     














Discontinued Medications














Generic Name Dose Route Start Last Admin





  Trade Name Freq  PRN Reason Stop Dose Admin


 


Sodium Chloride  1,000 mls @ 999 mls/hr  19 03:40  19 03:52





  Normal Saline  IV  19 04:40  999 mls/hr





  STAT ONE   Administration





     





     





     





     


 


Sodium Chloride  Confirm  19 03:48  19 03:52





  Normal Saline  Administered  19 03:49  20 mls/hr





  Dose   Administration





  20 mls @ as directed   





  .ROUTE   





  .STK-MED ONE   





     





     





     





     


 


Iopamidol  100 ml  19 04:55  19 04:55





  Isovue Multipack-370 (76%)  IVPUSH  19 04:56  100 ml





  ONETIME STA   Administration





     





     





     





     


 


Ketorolac Tromethamine  30 mg  19 03:41  19 03:52





  Toradol  IVPUSH  19 03:42  30 mg





  ONETIME ONE   Administration





     





     





     





     


 


Morphine Sulfate  2 mg  19 04:20  19 04:25





  Morphine  IVPUSH  19 04:21  2 mg





  ONETIME ONE   Administration





     





     





     





     


 


Ondansetron HCl  4 mg  19 03:41  19 03:52





  Zofran  IVPUSH  19 03:42  4 mg





  ONETIME ONE   Administration





     





     





     





     


 


Pantoprazole Sodium  80 mg  19 03:40  19 03:52





  Protonix Iv***  IVPUSH  19 03:41  80 mg





  .BOLUS ONE   Administration





     





     





     





     














Departure





- Departure


Disposition: Home, Self-Care 01


Condition: Good


Clinical Impression: 


 Atypical chest pain, Upper abdominal pain








- Discharge Information


Referrals: 


PCP,None [Primary Care Provider] - 


Forms:  ED Department Discharge


Additional Instructions: 


The following information is given to patients seen in the emergency department 

who are being discharged to home. This information is to outline your options 

for follow-up care. We provide all patients seen in our emergency department 

with a follow-up referral.





The need for follow-up, as well as the timing and circumstances, are variable 

depending upon the specifics of your emergency department visit.





If you don't have a primary care physician on staff, we will provide you with a 

referral. We always advise you to contact your personal physician following an 

emergency department visit to inform them of the circumstance of the visit and 

for follow-up with them and/or the need for any referrals to a consulting 

specialist.





The emergency department will also refer you to a specialist when appropriate. 

This referral assures that you have the opportunity for followup care with a 

specialist. All of these measure are taken in an effort to provide you with 

optimal care, which includes your followup.





Under all circumstances we always encourage you to contact your private 

physician who remains a resource for coordinating  your care. When calling for 

followup care, please make the office aware that this follow-up is from your 

recent emergency room visit. If for any reason you are refused follow-up, 

please contact the [facility] emergency 

department at [phone] and ask to speak to the emergency department 

charge nurse.





[facility]


[address] [place] [id number] [phone]





[facility] 


[facility]


[address] [place] [id number] [phone]








Please follow-up with your provider at [facility] or primary care for further 

evaluation and care of these issues. Return to ER as needed and as discussed





<Dmitriy Lopez - Last Filed: 19 05:41>





ED HPI GENERAL MEDICAL PROBLEM





- History of Present Illness


INITIAL COMMENTS - FREE TEXT/NARRATIVE: 


Patient's emergency department course has been unremarkable CT angina over 

chest was negative there is no evidence of acute findings and no pulmonary 

embolism CT of the pelvis was similarly negative patient will be discharged 

home follow-up with her private medical doctor return as needed as








Departure





- Departure


Time of Disposition: 05:41

## 2019-09-23 NOTE — CT
INDICATION: chest pain, short of breath



CT CHEST, ABDOMEN, AND PELVIS WITH CONTRAST



TECHNIQUE:  Multidetector axial CT imaging was performed through the chest, 

abdomen, and pelvis following intravenous contrast administration using 

100mL Isovue 370.  Coronal and sagittal reconstructions were generated. 



COMPARISON:  None.



FINDINGS:  The lungs are clear.  No pleural effusions.  No mediastinal 

masses or lymphadenopathy.  The aorta is normal in caliber.  There is no 

evidence of pulmonary emboli.  The heart appears within normal limits.  No 

significant pericardial effusion.  Thoracic osseous structures show no 

fractures or other significant findings.



The liver, spleen, gallbladder, pancreas, stomach, adrenals, and kidneys 

show no significant abnormalities aside from a tiny left renal cyst.  Bowel 

loops are of normal caliber and demonstrate no wall thickening.  The 

appendix is normal.  No free fluid or free air is identified.  No 

abnormally enlarged lymph nodes are seen in the abdomen or pelvis.  The 

urinary bladder, uterus, and adnexal regions are within normal limits.  The 

lower spine and bony pelvis show no fractures or other significant 

findings.



IMPRESSION:  No acute abnormality identified.  No evidence of pulmonary 

emboli.



YESY MONTERO MD



[facility]



Dictated by: Roland Montero MD @ 09/23/2019 05:38:11



(Electronically Signed)

## 2019-09-23 NOTE — CT
INDICATION: chest pain, short of breath



CT CHEST, ABDOMEN, AND PELVIS WITH CONTRAST



TECHNIQUE:  Multidetector axial CT imaging was performed through the chest, 

abdomen, and pelvis following intravenous contrast administration using 

100mL Isovue 370.  Coronal and sagittal reconstructions were generated. 



COMPARISON:  None.



FINDINGS:  The lungs are clear.  No pleural effusions.  No mediastinal 

masses or lymphadenopathy.  The aorta is normal in caliber.  There is no 

evidence of pulmonary emboli.  The heart appears within normal limits.  No 

significant pericardial effusion.  Thoracic osseous structures show no 

fractures or other significant findings.



The liver, spleen, gallbladder, pancreas, stomach, adrenals, and kidneys 

show no significant abnormalities aside from a tiny left renal cyst.  Bowel 

loops are of normal caliber and demonstrate no wall thickening.  The 

appendix is normal.  No free fluid or free air is identified.  No 

abnormally enlarged lymph nodes are seen in the abdomen or pelvis.  The 

urinary bladder, uterus, and adnexal regions are within normal limits.  The 

lower spine and bony pelvis show no fractures or other significant 

findings.



IMPRESSION:  No acute abnormality identified.  No evidence of pulmonary 

emboli.



YESY MONTERO MD



[facility]



Dictated by: Roland Montero MD @ 09/23/2019 05:37:54



(Electronically Signed)

## 2019-09-23 NOTE — CR
INDICATION: pain, short of breath



CHEST, ONE VIEW



An AP radiograph of the chest was performed.   



Comparison:  No previous studies are currently available for comparison.



The lungs appear clear and no pleural effusions are identified.  The 

cardiomediastinal silhouette and pulmonary vasculature appear normal, as do 

the visualized bones.



IMPRESSION:  No acute intrathoracic abnormality identified.



YESY MONTERO MD



[facility]



Dictated by: Roland Montero MD @ 09/23/2019 04:35:54



(Electronically Signed)

## 2020-01-30 NOTE — CR
Right ankle: 3 views the right ankle were obtained.

 

Comparison: No prior ankle study.

 

Soft tissue swelling is seen laterally.  Fracture is identified within

 the distal lateral malleolus.  Minimal comminution is seen.  Ankle 

mortise is symmetric.  No additional fracture or other bony 

abnormality seen.

 

Impression:

1.  Slightly comminuted distal fibular fracture as noted above.

2.  Soft tissue swelling.

 

Diagnostic code #3

 

Study was dictated in [place] Standard Time

## 2020-01-30 NOTE — EDM.PDOC
ED HPI GENERAL MEDICAL PROBLEM





- General


Chief Complaint: Lower Extremity Injury/Pain


Stated Complaint: RIGHT ANKLE


Time Seen by Provider: 01/30/20 19:35


Source of Information: Reports: Patient


History Limitations: Reports: No Limitations





- History of Present Illness


INITIAL COMMENTS - FREE TEXT/NARRATIVE: 


HISTORY AND PHYSICAL:





History of present illness:


Patient is a 19-year-old female who presents to the emergency room with 

complaints of right ankle pain.  She states she was reaching up to get 

something off a shelf when she had fallen, rolling her ankle. She denies 

hitting her head or having any LOC.  She denies any other extremity 

involvement.  Offers no systemic complaints.  Patient was ambulatory into the 

emergency room although states it was painful to bear weight.





Review of systems: 


As per history of present illness and below otherwise all systems reviewed and 

negative.





Past medical history: 


As per history of present illness and as reviewed below otherwise 

noncontributory.





Surgical history: 


As per history of present illness and as reviewed below otherwise 

noncontributory.





Social history: 


See social history for further information





Family history: 


As per history of present illness and as reviewed below otherwise 

noncontributory.





Physical exam:


General: Well-developed and well-nourished 19-year-old female.  Alert and 

oriented.  Nontoxic-appearing and in no acute distress.


HEENT: Atraumatic, normocephalic, pupils equal and reactive bilaterally, 

negative for conjunctival pallor or scleral icterus, mucous membranes moist, 

trachea midline. No drooling or trismus noted. No meningeal signs. No hot 

potato voice noted. 


Lungs: Clear to auscultation, breath sounds equal bilaterally, chest nontender.


Heart: S1S2, regular rate and rhythm without overt murmur


Abdomen: Soft, nondistended, nontender. 


Skin: Intact, warm, dry. No lesions or rashes noted.


Extremities: Soft tissue swelling and pain to the right lateral ankle.  No foot

, medial malleolus or toe pain.  She moves all extremities per self without 

difficulty or deficits, negative for cords or calf pain.  Strong pedal and 

pretibial pulses.  No foot drop. Cap refill less than 2 secs. neurovascular 

unremarkable.


Neuro: Awake, alert, oriented. Cranial nerves II through XII unremarkable. 

Cerebellum unremarkable. Motor and sensory unremarkable throughout. Exam 

nonfocal.





Notes:





Supportive care measures were reviewed and discussed. Voices understanding and 

is agreeable to plan of care. Denies any further questions or concerns at this 

time.





Diagnostics:


Ankle x-ray





Therapeutics:


Toradol IM, Crutches





Prescription:


Norco (#20)





Impression: 


Right distal fibular fracture





Plan:


1. Rest, ice, elevate the affected extremity. Please wear the splint and use 

crutches as directed.


2. Tylenol and/or Ibuprofen as needed for pain management.  Norco as needed for 

moderate to severe pain.  This medication is a narcotic so do not take it while 

driving or needing to be functioning outside of the house.


3. Follow up with the Orthopedic provider as we discussed. Return to the ED as 

needed and as discussed.





Definitive disposition and diagnosis as appropriate pending reevaluation and 

review of above.





  ** right ankle


Pain Score (Numeric/FACES): 7





- Related Data


 Allergies











Allergy/AdvReac Type Severity Reaction Status Date / Time


 


No Known Allergies Allergy   Verified 01/30/20 19:35











Home Meds: 


 Home Meds





Sertraline [Zoloft] 25 mg PO DAILY 01/30/20 [History]











Past Medical History





- Past Health History


Medical/Surgical History: Denies Medical/Surgical History


HEENT History: Reports: None, Other (See Below)


Cardiovascular History: Reports: None


Respiratory History: Reports: None


Gastrointestinal History: Reports: None


Genitourinary History: Reports: None


OB/GYN History: Reports: Pregnancy


Musculoskeletal History: Reports: None


Neurological History: Reports: None


Psychiatric History: Reports: None


Endocrine/Metabolic History: Reports: None


Hematologic History: Reports: None


Immunologic History: Reports: None


Oncologic (Cancer) History: Reports: None


Dermatologic History: Reports: None





- Infectious Disease History


Infectious Disease History: Reports: Influenza





- Past Surgical History


Head Surgeries/Procedures: Reports: None


HEENT Surgical History: Reports: None


Cardiovascular Surgical History: Reports: None


Respiratory Surgical History: Reports: None


GI Surgical History: Reports: None


Female  Surgical History: Reports: None


Endocrine Surgical History: Reports: None


Neurological Surgical History: Reports: None


Musculoskeletal Surgical History: Reports: None


Oncologic Surgical History: Reports: None





Social & Family History





- Family History


Family Medical History: Noncontributory


HEENT: Reports: None


Cardiac: Reports: None


Respiratory: Reports: None


GI: Reports: None


: Reports: None


OBGYN: Reports: None


Musculoskeletal: Reports: None


Neurological: Reports: None


Psychiatric: Reports: None


Endocrine/Metabolic: Reports: None


Hematologic: Reports: None


Immunologic: Reports: None


Dermatologic: Reports: None


Oncologic: Reports: None





- Caffeine Use


Caffeine Use: Reports: Coffee, Soda





Review of Systems





- Review of Systems


Review Of Systems: Comprehensive ROS is negative, except as noted in HPI.





ED EXAM, GENERAL





- Physical Exam


Exam: See Below (See dictation)





Course





- Vital Signs


Last Recorded V/S: 


 Last Vital Signs











Temp  97.0 F   01/30/20 19:14


 


Pulse  88   01/30/20 19:14


 


Resp  16   01/30/20 19:14


 


BP  121/61   01/30/20 19:14


 


Pulse Ox  97   01/30/20 19:14














- Orders/Labs/Meds


Orders: 


 Active Orders 24 hr











 Category Date Time Status


 


 DME for Discharge [COMM] Stat Oth  01/30/20 20:13 Ordered











Meds: 


Medications














Discontinued Medications














Generic Name Dose Route Start Last Admin





  Trade Name Freq  PRN Reason Stop Dose Admin


 


Ketorolac Tromethamine  60 mg  01/30/20 19:48  01/30/20 19:53





  Toradol  IM  01/30/20 19:49  60 mg





  ONETIME ONE   Administration





     





     





     





     














Departure





- Departure


Time of Disposition: 20:37


Disposition: Home, Self-Care 01


Clinical Impression: 


Fracture of distal end of fibula


Qualifiers:


 Encounter type: initial encounter Fracture type: closed Fracture morphology: 

other fracture Laterality: right Qualified Code(s): S82.831A - Other fracture 

of upper and lower end of right fibula, initial encounter for closed fracture








- Discharge Information


Instructions:  Ankle Fracture, Easy-to-Read


Referrals: 


Nicola Tran MD [Primary Care Provider] - 


Forms:  ED Department Discharge


Additional Instructions: 


The following information is given to patients seen in the emergency department 

who are being discharged to home. This information is to outline your options 

for follow-up care. We provide all patients seen in our emergency department 

with a follow-up referral.





The need for follow-up, as well as the timing and circumstances, are variable 

depending upon the specifics of your emergency department visit.





If you don't have a primary care physician on staff, we will provide you with a 

referral. We always advise you to contact your personal physician following an 

emergency department visit to inform them of the circumstance of the visit and 

for follow-up with them and/or the need for any referrals to a consulting 

specialist.





The emergency department will also refer you to a specialist when appropriate. 

This referral assures that you have the opportunity for follow-up care with a 

specialist. All of these measure are taken in an effort to provide you with 

optimal care, which includes your follow-up.





Under all circumstances we always encourage you to contact your private 

physician who remains a resource for coordinating your care. When calling for 

follow-up care, please make the office aware that this follow-up is from your 

recent emergency room visit. If for any reason you are refused follow-up, 

please contact the [facility] Emergency 

Department at [phone] and asked to speak to the emergency department 

charge nurse.





[facility]


Primary Care: Orthopedics


[address] [place] [address]


Phone: [phone]


Fax: [phone]





[facility]


[address] [place] [address]


Phone: [phone]


Fax: [phone]





1. Rest, ice, elevate the affected extremity. Please wear the splint and use 

crutches as directed.


2. Tylenol and/or Ibuprofen as needed for pain management.  Norco as needed for 

moderate to severe pain.  This medication is a narcotic so do not take it while 

driving or needing to be functioning outside of the house.


3. Follow up with the Orthopedic provider as we discussed. Return to the ED as 

needed and as discussed.





Sepsis Event Note





- Evaluation


Sepsis Screening Result: No Definite Risk





- Focused Exam


Vital Signs: 


 Vital Signs











  Temp Pulse Resp BP Pulse Ox


 


 01/30/20 19:14  97.0 F  88  16  121/61  97











Date Exam was Performed: 01/30/20


Time Exam was Performed: 20:34





- My Orders


Last 24 Hours: 


My Active Orders





01/30/20 20:13


DME for Discharge [COMM] Stat 














- Assessment/Plan


Last 24 Hours: 


My Active Orders





01/30/20 20:13


DME for Discharge [COMM] Stat
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
## 2021-06-14 NOTE — OR
SURGEON:

Pebbles Barrera M.D.

 

DATE OF PROCEDURE:  2021

 

PREOPERATIVE DIAGNOSES:

A 39-week intrauterine pregnancy, prior  delivery.

 

POSTOPERATIVE DIAGNOSES:

1. A 39-week intrauterine pregnancy, prior  delivery.

2. Declines vaginal trial of labor.

 

PROCEDURE:

Repeat low transverse  section.

 

PRIMARY SURGEON:

Pebbles Barrera M.D.

 

ANESTHESIA:

Spinal.

 

ESTIMATED BLOOD LOSS:

300 mL.

 

FLUIDS:

1200 mL crystalloid.

 

FINDINGS:

Liveborn male.  Apgar scores of 8 and 9.  Weighing 3890 g.  Normal-appearing

uterus, tubes, and ovaries.

 

COMPLICATIONS:

None known.

 

DISPOSITION:

Mother and baby are in LDR in good condition.

 

BRIEF HISTORY:

This is a 20-year-old female G2, P1-0-0-1.  She presents at 39 plus weeks

gestation for a repeat  section with risks discussed including bleeding;

infection; injury to bowel, bladder, blood vessels, ureters, or other organs;

risk of thromboembolic event; and risk of anesthesia.  Understanding all these

risks, she does desire to proceed.

 

DESCRIPTION OF PROCEDURE:

With the patient in left tilt position, under adequate spinal analgesia, the

abdomen was prepped with chlorhexidine and draped in usual fashion for abdominal

surgery.  SCDs had been placed.  Christianson catheter had been placed.  Appropriate

time-out was held, and she had received 2 g of Ancef IV.  After documentation of

adequate analgesia, the prior cicatrix was excised, and the incision was carried

through the subcutaneous tissue to the fascia, which was scored transversely in

the midline.  The fascia was elevated from the underlying rectus muscle using

sharp and blunt dissection.  The rectus muscles were  in the midline

using sharp and blunt dissection.  The peritoneum was entered, and the incision

was extended.  The Jude O  retractor was placed.  The visceral

peritoneum over the lower uterine segment was incised.  A transverse curvilinear

incision was made over the lower uterine segment with a scalpel.  A finger was

used to enter the amniotic cavity.  Clear fluid was noted.  The incision was

extended using cephalad and caudad traction to extend the transverse incision.

The fetal head was delivered via the uterine incision without difficulty with

subsequent delivery of the infant's shoulders and body.  There was a body cord

that was reduced.  The infant was held for 1 minute at which time, the cord was

doubly clamped and cut and the infant was handed to the nurse in attendance at

delivery.  The infant was a liveborn male, Apgar scores of 8 and 9, weighing

3890 g.  Cord blood was collected for cord ABGs as well as routine cord blood

sampling.  Pitocin was initiated after delivery of the infant to assist with

delivery of the placenta, which was delivered by manual extraction.  The uterus

was cleaned with a wet laparotomy tape, and the cervix was opened with ring

forceps.  The uterine incision was closed with a running locked suture of 0

Polysorb followed by an imbricating layer of 0 Polysorb.  Tubes and ovaries were

inspected and were normal.  The wet laparotomy tape was used to clean the

paracolic gutters and posterior cul-de-sac.  The uterine incision was again

inspected, was hemostatic.  The Jude O  retractor was removed.  Final

inspection revealed complete hemostasis.  The rectus muscle and peritoneum were

then loosely approximated in the midline using a running mattress suture of 0

Polysorb.  The posterior aspect of the fascia was inspected and any areas of

bleeding that were noted were cauterized.  The fascial incision was closed with

a running suture of 0 Polysorb.  Subcutaneous tissue was irrigated.  Any areas

of bleeding that were noted were cauterized, and the skin was closed with a

running subcuticular suture of 3-0 Monocryl followed by Dermabond.  Final

sponge, needle, and instrument counts were reported as correct.  There were no

known complications.  Mother and baby are in recovery in good condition.

 

 

CESARIO CARDONA

DD:  2021 09:14:42

DT:  2021 15:43:33

Job #:  [id number]

## 2021-06-14 NOTE — PCM.PREANE
Preanesthetic Assessment





- Anesthesia/Transfusion/Family Hx


Anesthesia History: Prior Anesthesia Without Reaction


Family History of Anesthesia Reaction: No


Transfusion History: No Prior Transfusion(s)


Type of Transfusion Reactions: Reports: Unknown





- Review of Systems


General: No Symptoms


Pulmonary: No Symptoms


Cardiovascular: No Symptoms


Gastrointestinal: No Symptoms


Neurological: No Symptoms


Other: Reports: None





- Physical Assessment


NPO Status Date: 21


NPO Status Time: 00:00


Height: 5 ft 7 in


Weight: 230 lb


ASA Class: 2


Mental Status: Alert & Oriented x3


Airway Class: Mallampati = 2


Dentition: Reports: Normal Dentition


ROM/Head Extension: Full


Lungs: Clear to Auscultation, Normal Respiratory Effort


Cardiovascular: Regular Rate, Regular Rhythm





- Lab


Values: 





                             Laboratory Last Values











WBC  10.33 K/uL (4.0-11.0)   21  05:45    


 


RBC  4.54 M/uL (4.30-5.90)   21  05:45    


 


Hgb  13.0 g/dL (12.0-16.0)   21  05:45    


 


Hct  39.6 % (36.0-46.0)   21  05:45    


 


MCV  87.2 fL (80.0-98.0)   21  05:45    


 


MCH  28.6 pg (27.0-32.0)   21  05:45    


 


MCHC  32.8 g/dL (31.0-37.0)   21  05:45    


 


RDW Std Deviation  49.0 fl (28.0-62.0)   21  05:45    


 


RDW Coeff of Nicolette  15 % (11.0-15.0)   21  05:45    


 


Plt Count  251 K/uL (150-400)   21  05:45    


 


MPV  11.30 fL (7.40-12.00)   21  05:45    


 


Nucleated RBC %  0.0 /100WBC  21  05:45    


 


Nucleated RBCs #  0 K/uL  21  05:45    


 


Blood Type  A POSITIVE   21  05:45    


 


Antibody Screen  NEGATIVE   21  05:45    














- Allergies


Allergies/Adverse Reactions: 


                                    Allergies











Allergy/AdvReac Type Severity Reaction Status Date / Time


 


No Known Allergies Allergy   Verified 21 07:32














- Blood


Blood Available: Yes


Product(s) Available: PRBC





- Anesthesia Plan


Pre-Op Medication Ordered: None





- Acknowledgements


Anesthesia Type Planned: Spinal


Pt an Appropriate Candidate for the Planned Anesthesia: Yes


Alternatives and Risks of Anesthesia Discussed w Pt/Guardian: Yes


Pt/Guardian Understands and Agrees with Anesthesia Plan: Yes





PreAnesthesia Questionnaire





- Past Health History


Medical/Surgical History: Denies Medical/Surgical History


HEENT History: Reports: None, Other (See Below)


Cardiovascular History: Reports: None


Respiratory History: Reports: None


Gastrointestinal History: Reports: None


Genitourinary History: Reports: None


OB/GYN History: Reports: Pregnancy


Other OB/BYN History:  section for fetal distress


Musculoskeletal History: Reports: None


Other Musculoskeletal History: hx fx leg


Neurological History: Reports: None


Psychiatric History: Reports: Depression


Endocrine/Metabolic History: Reports: None


Hematologic History: Reports: None


Immunologic History: Reports: None


Oncologic (Cancer) History: Reports: None


Dermatologic History: Reports: None





- Infectious Disease History


Infectious Disease History: Reports: Influenza





- Past Surgical History


Head Surgeries/Procedures: Reports: None


HEENT Surgical History: Reports: None


Cardiovascular Surgical History: Reports: None


Respiratory Surgical History: Reports: None


GI Surgical History: Reports: None


Female  Surgical History: Reports: None,  Section


Endocrine Surgical History: Reports: None


Neurological Surgical History: Reports: None


Musculoskeletal Surgical History: Reports: None


Oncologic Surgical History: Reports: None





- SUBSTANCE USE


Tobacco Use Status *Q: Current Every Day Tobacco User


Tobacco Use Within Last Twelve Months: Cigarettes


Second Hand Smoke Exposure: Yes





- HOME MEDS


Home Medications: 


                                    Home Meds





Acetaminophen/Diphenhydramine [Tylenol Pm Ex-Strength Caplet] 1 tab PO BEDTIME 

PRN 21 [History]


Omeprazole 40 mg PO DAILY 21 [History]


Pnv No.95/Ferrous Fum/Folic AC [Prenatal Vitamin Tablet] 1 tab PO DAILY 21

[History]











- CURRENT (IN HOUSE) MEDS


Current Meds: 





                               Current Medications





Oxytocin/Sodium Chloride (Oxytocin 30 Unit/500 Ml-Ns)  30 unit in 500 mls @ 250 

mls/hr IV TITRATE KAREN


Lactated Ringer's (Ringers, Lactated)  1,000 mls @ 500 mls/hr IV BOLUS KAREN


   Last Admin: 21 06:31 Dose:  500 mls/hr


   Documented by: 


Sodium Chloride (Sodium Chloride 0.9% 10 Ml Syringe)  10 ml FLUSH ASDIRECTED PRN


   PRN Reason: Keep Vein Open


Sodium Chloride (Sodium Chloride 0.9% 2.5 Ml Syringe)  2.5 ml FLUSH ASDIRECTED 

PRN


   PRN Reason: Keep Vein Open


Sodium Chloride (Sodium Chloride 0.9% 10 Ml Sdv)  10 ml IV ASDIRECTED PRN


   PRN Reason: IV Use





Discontinued Medications





Cefazolin Sodium (Cefazolin 1 Gm Vial) Confirm Administered Dose 2 gm .ROUTE 

.STK-MED ONE


   Stop: 21 07:20


Citric Acid/Sodium Citrate (Citric Acid/Sodium Citrate Solution 30 Ml Cup)  30 

ml PO ONETIME ONE


   Stop: 21 05:55


Fentanyl (Fentanyl 100 Mcg/2 Ml Sdv) Confirm Administered Dose 100 mcg .ROUTE 

.STK-MED ONE


   Stop: 21 07:13


Sodium Chloride (Normal Saline) Confirm Administered Dose 20 mls @ as directed 

.ROUTE .STK-MED ONE


   Stop: 21 07:20


Ketorolac Tromethamine (Ketorolac 30 Mg/Ml Sdv) Confirm Administered Dose 30 mg 

.ROUTE .STK-MED ONE


   Stop: 21 07:19


Morphine Sulfate (Morphine Pf 10 Mg/10 Ml Sdv) Confirm Administered Dose 10 mg 

.ROUTE .STK-MED ONE


   Stop: 21 07:14


Octyl Cyanoacrylate (Octyl 2-Cyanoacrylate 1 Tube) Confirm Administered Dose 1 

applic .ROUTE .STK-MED ONE


   Stop: 21 07:19


Ondansetron HCl (Ondansetron 4 Mg/2 Ml Sdv) Confirm Administered Dose 4 mg 

.ROUTE .STK-MED ONE


   Stop: 21 07:19


Oxytocin (Oxytocin 10 Units/1 Ml Sdv) Confirm Administered Dose 30 unit .ROUTE 

.STK-MED ONE


   Stop: 21 07:19 Pt called and would like to speak to you about his recent x rays. He never go a chance to talk to you. He will like your recommendations. He still having abd pain. Pt will be home on Tuesday. He will like a call back then. Thanks.

## 2021-06-14 NOTE — PCM.OPNOTE
- General Post-Op/Procedure Note


Date of Surgery/Procedure: 21


Operative Procedure(s): repeat low transverse 


Findings: 





Liveborn male APGAR 8/9 weight 3890 grams, normal uterus, tubes and ovaries.


Pre Op Diagnosis: 39 weeks, prior , declines VTOL


Post-Op Diagnosis: Same


Anesthesia Technique: Spinal


Primary Surgeon: Pebbles Barrera


Anesthesia Provider: Amish Maxwell


Pathology: 





none


Fluid Replacement, Intraop: 1,200


EBL in mLs: 300


Complications: None Known


Condition: Good

## 2021-06-15 NOTE — PCM.PNPP
- General Info


Date of Service: 06/15/21


Functional Status: Reports: Pain Controlled, Tolerating Diet, Ambulating, 

Urinating





- Review of Systems


General: Reports: No Symptoms


HEENT: Reports: No Symptoms


Pulmonary: Reports: No Symptoms


Cardiovascular: Reports: No Symptoms


Gastrointestinal: Reports: No Symptoms


Genitourinary: Reports: No Symptoms


Musculoskeletal: Reports: No Symptoms


Skin: Reports: No Symptoms


Neurological: Reports: No Symptoms


Psychiatric: Reports: No Symptoms





- Patient Data


Vital Signs - Most Recent: 


                                Last Vital Signs











Temp  35.6 C L  06/15/21 08:39


 


Pulse  85   06/15/21 08:39


 


Resp  18   06/15/21 08:39


 


BP  111/88   06/15/21 08:39


 


Pulse Ox  96   06/15/21 08:39











Weight - Most Recent: 104.326 kg


I&O - Last 24 Hours: 


                                 Intake & Output











 06/14/21 06/15/21 06/15/21





 22:59 06:59 14:59


 


Intake Total 2608  


 


Output Total 20 180  


 


Balance 240X -180  











Lab Results - Last 24 Hours: 


                         Laboratory Results - last 24 hr











  06/15/21 Range/Units





  05:20 


 


Hgb  11.4 L  (12.0-16.0)  g/dL


 


Hct  35.9 L  (36.0-46.0)  %











Med Orders - Current: 


                               Current Medications





Albuterol (Albuterol 0.083% 2.5 Mg/3 Ml Neb Soln)  2.5 mg NEB ONETIME PRN


   PRN Reason: Wheezing


Bisacodyl (Bisacodyl 10 Mg Supp)  10 mg RECTAL ONETIME PRN


   PRN Reason: Constipation


Diphenhydramine HCl (Diphenhydramine 50 Mg/Ml Sdv)  25 mg IVPUSH Q6H PRN


   PRN Reason: Itching or Nausea


Docusate Sodium (Docusate Sodium 100 Mg Cap)  100 mg PO BID KAREN


   Last Admin: 06/15/21 08:25 Dose:  100 mg


   Documented by: 


Droperidol (Droperidol 5 Mg/2 Ml Sdv)  0.625 mg IVPUSH ONETIME PRN


   PRN Reason: Nausea/Vomiting


Emollient Ointment (Lanolin 100% Cream 7 Gm Tube)  0 gm TOP ASDIRECTED PRN


   PRN Reason: Sore Nipples


Ephedrine Sulfate (Ephedrine 50 Mg/Ml Sdv)  10 mg IVPUSH Q5M PRN


   PRN Reason: Hypotension


Fentanyl (Fentanyl 100 Mcg/2 Ml Sdv)  50 mcg IVPUSH Q1H PRN


   PRN Reason: Pain (severe 7-10)


Fentanyl (Fentanyl 100 Mcg/2 Ml Sdv)  50 mcg IVPUSH Q5M PRN


   PRN Reason: Pain (mild 1-3)


Hydromorphone HCl (Hydromorphone 2 Mg/Ml Syringe)  0.5 mg IVPUSH Q10M PRN


   PRN Reason: Pain (moderate 4-6)


Lactated Ringer's (Ringers, Lactated)  1,000 mls @ 125 mls/hr IV ASDIRECTED [facility]


   Last Admin: 21 17:21 Dose:  999 mls/hr


   Documented by: 


Oxytocin/Lactated Ringer's (Pitocin In Lr 30 Units/500 Ml)  30 unit in 500 mls @

999 mls/hr IV TITRATE [facility]; Protocol


Tranexamic Acid 1,000 mg/ (Sodium Chloride)  110 mls @ 660 mls/hr IV ONETIME PRN


   PRN Reason: Bleeding


Ibuprofen (Ibuprofen 800 Mg Tab)  800 mg PO Q8H PRN


   PRN Reason: mild pain or fever


Metoclopramide HCl (Metoclopramide 10 Mg/2 Ml Sdv)  10 mg IVPUSH ONETIME PRN


   PRN Reason: Nausea/Vomiting


Misoprostol (Misoprostol 200 Mcg Tab)  1,000 mcg RECTAL ONETIME PRN


   PRN Reason: excessive bleeding


Nalbuphine HCl (Nalbuphine 10 Mg/1 Ml Vial)  5 mg IVPUSH ASDIRECTED PRN


   PRN Reason: Itching


Naloxone HCl (Naloxone 0.4 Mg/Ml Syringe)  0.1 mg IVPUSH ASDIRECTED PRN


   PRN Reason: Respiratory Depression


Nicotine (Nicotine 7 Mg/24 Hr Patch)  7 mg TRDERM DAILY [facility]


   Last Admin: 06/15/21 08:25 Dose:  7 mg


   Documented by: 


Ondansetron HCl (Ondansetron 4 Mg/2 Ml Sdv)  4 mg IVPUSH Q6H PRN


   PRN Reason: Nausea


Ondansetron HCl (Ondansetron 4 Mg/2 Ml Sdv)  4 mg IVPUSH Q4H PRN


   PRN Reason: Nausea/Vomiting


Oxycodone/Acetaminophen (Acetaminophen/Oxycodone 325-5 Mg Tab)  1 tab PO Q4H PRN


   PRN Reason: Pain (severe 7-10)


Oxycodone/Acetaminophen (Acetaminophen/Oxycodone 325-5 Mg Tab)  2 tab PO Q4H PRN


   PRN Reason: Pain (severe 7-10)


Oxytocin (Oxytocin 10 Units/1 Ml Sdv)  10 unit IM ASDIRECTED PRN


   PRN Reason: Excessive Vaginal Bleeding


Phenylephrine HCl (Phenylephrine/Normal Saline 100 Mcg/Ml 10 Ml Syringe)  0.1 mg

IVPUSH Q5M PRN


   PRN Reason: Hypotension


Sodium Chloride (Sodium Chloride 0.9% 10 Ml Syringe)  10 ml FLUSH ASDIRECTED PRN


   PRN Reason: Keep Vein Open


Sodium Chloride (Sodium Chloride 0.9% 2.5 Ml Syringe)  2.5 ml FLUSH ASDIRECTED 

PRN


   PRN Reason: Keep Vein Open


Sodium Chloride (Sodium Chloride 0.9% 10 Ml Sdv)  10 ml IV ASDIRECTED PRN


   PRN Reason: IV Use





Discontinued Medications





Cefazolin Sodium (Cefazolin 1 Gm Vial) Confirm Administered Dose 2 gm .ROUTE 

.STK-MED ONE


   Stop: 21 07:20


Citric Acid/Sodium Citrate (Citric Acid/Sodium Citrate Solution 30 Ml Cup)  30 

ml PO ONETIME ONE


   Stop: 21 05:55


Diphenhydramine HCl (Diphenhydramine 50 Mg/Ml Sdv)  12.5 mg IVPUSH Q2H PRN


   PRN Reason: Itching


Fentanyl (Fentanyl 100 Mcg/2 Ml Sdv) Confirm Administered Dose 100 mcg .ROUTE 

.STK-MED ONE


   Stop: 21 07:13


Oxytocin/Sodium Chloride (Oxytocin 30 Unit/500 Ml-Ns)  30 unit in 500 mls @ 250 

mls/hr IV TITRATE KAREN


Lactated Ringer's (Ringers, Lactated)  1,000 mls @ 500 mls/hr IV BOLUS [facility]


   Last Admin: 21 06:31 Dose:  500 mls/hr


   Documented by: 


Sodium Chloride (Normal Saline) Confirm Administered Dose 20 mls @ as directed 

.ROUTE .STK-MED ONE


   Stop: 21 07:20


Ketorolac Tromethamine (Ketorolac 30 Mg/Ml Sdv) Confirm Administered Dose 30 mg 

.ROUTE .STK-MED ONE


   Stop: 21 07:19


Ketorolac Tromethamine (Ketorolac 30 Mg/Ml Sdv)  30 mg IVPUSH Q6H KAREN


   Stop: 06/15/21 09:01


   Last Admin: 06/15/21 08:33 Dose:  30 mg


   Documented by: 


Morphine Sulfate (Morphine Pf 10 Mg/10 Ml Sdv) Confirm Administered Dose 10 mg 

.ROUTE .STK-MED ONE


   Stop: 21 07:14


Morphine Sulfate (Morphine 2 Mg/Ml Syringe)  2 mg IVPUSH Q10M PRN


   PRN Reason: Pain (severe 7-10)


Naloxone HCl (Naloxone 0.4 Mg/Ml Syringe)  0.1 mg IVPUSH ONETIME PRN


   PRN Reason: Respiratory Depression


   Stop: 06/15/21 07:34


Octyl Cyanoacrylate (Octyl 2-Cyanoacrylate 1 Tube) Confirm Administered Dose 1 

applic .ROUTE .STK-MED ONE


   Stop: 21 07:19


Ondansetron HCl (Ondansetron 4 Mg/2 Ml Sdv) Confirm Administered Dose 4 mg 

.ROUTE .STK-MED ONE


   Stop: 21 07:19


Ondansetron HCl (Ondansetron 4 Mg/2 Ml Sdv)  4 mg IVPUSH ONETIME PRN


   PRN Reason: Nausea/Vomiting


Oxycodone/Acetaminophen (Acetaminophen/Oxycodone 325-5 Mg Tab)  2 tab PO Q6H PRN


   PRN Reason: Pain (moderate 4-6)


Oxytocin (Oxytocin 10 Units/1 Ml Sdv) Confirm Administered Dose 30 unit .ROUTE 

.STK-MED ONE


   Stop: 21 07:19











- Infant Interaction


Infant Disposition, Postpartum:  in Room with Family


Infant Interaction: Holding Infant


Infant Feeding:  Infant; Nursed Well


Support Person: Significant Other





- Postpartum Recovery Exam


Fundal Tone: Firm


Fundal Level: 2 Fingerbreadths Below Umbilicus


Fundal Placement: Midline


Lochia Amount: Scant


Lochia Color: Rubra/Red


Perineum Description: Intact, Minimal Bruising/Swelling


Episiotomy/Laceration: None


Bladder Status: Indwelling Catheter in Place


Urinary Elimination: Indwelling Catheter





- Exam


Neck: Supple


Lungs: Normal Respiratory Effort


GI/Abdominal Exam: Soft, Non-Tender


Extremities: Non-Tender, No Pedal Edema


Wound/Incisions: Dressing Dry and Intact


Neurological: No New Focal Deficit


Psy/Mental Status: Alert, Normal Affect, Normal Mood





- Problem List & Annotations


(1)  delivery delivered


SNOMED Code(s): 080884693


   Code(s): O82 - ENCOUNTER FOR  DELIVERY WITHOUT INDICATION   Status: 

Acute   Current Visit: No   





- Problem List Review


Problem List Initiated/Reviewed/Updated: Yes





- My Orders


Last 24 Hours: 


My Active Orders





21 08:47


Patient Status [ADT] Routine 


Ambulate [RC] PER UNIT ROUTINE 


Antiembolic Devices [RC] PER UNIT ROUTINE 


Communication Order [RC] PER UNIT ROUTINE 


May Shower [RC] ASDIRECTED 


RT Incentive Spirometry [RC] Q2HWA 


Acetaminophen/oxyCODONE [Percocet 325-5 MG]   1 tab PO Q4H PRN 


Acetaminophen/oxyCODONE [Percocet 325-5 MG]   2 tab PO Q4H PRN 


Ibuprofen [Motrin]   800 mg PO Q8H PRN 


Lanolin [Lansinoh HPA]   See Dose Instructions  TOP ASDIRECTED PRN 


Ondansetron [Zofran]   4 mg IVPUSH Q4H PRN 


Oxytocin [Pitocin]   10 unit IM ASDIRECTED PRN 


Tranexamic Acid [Cyklokapron] 1,000 mg   Sodium Chloride 0.9% [Normal Saline] 

100 ml IV ONETIME 


bisacodyL [Dulcolax]   10 mg RECTAL ONETIME PRN 


diphenhydrAMINE [Benadryl]   25 mg IVPUSH Q6H PRN 


miSOPROStoL [Cytotec]   1,000 mcg RECTAL ONETIME PRN 


Abdominal Binder [OM.PC] Urgent 


Assess Lochia [WOMSER] Per Unit Routine 


Assess Uterine Involution [WOMSER] Per Unit Routine 


Breast Pump [WOMSER] Per Unit Routine 


Peripheral IV Discontinue [OM.PC] Routine 


Sequential Compression Device [OM.PC] Per Unit Routine 


Resuscitation Status Routine 





21 08:48


Notify Provider Intake and Out [RC] ASDIRECTED 


Notify Provider Vital Signs [RC] ASDIRECTED 





21 09:00


Docusate Sodium [Colace]   100 mg PO BID 


Lactated Ringers [Ringers, Lactated] 1,000 ml IV ASDIRECTED 


Oxytocin/Lactated Ringers [Pitocin in LR 30 Units/500 ML] 30 unit in 500 ml IV 

TITRATE 





21 10:30


Nicotine [Habitrol]   7 mg TRDERM DAILY 





21 Lunch


Regular Diet [DIET] 














- Assessment


Assessment:: 


PPD#1 after repeat low transverse .  Stable, minimal lochia, pain is 

well controlled.








- Plan


Plan:: 


Encourage ambulation, may shower, ambulate today.  Anticipate home in am.

## 2021-06-16 NOTE — PCM.PNPP
- General Info


Date of Service: 21


Functional Status: Reports: Pain Controlled, Tolerating Diet, Ambulating, 

Urinating, Other (has had bowel movement and passed gas)





- Review of Systems


General: Reports: No Symptoms


HEENT: Reports: No Symptoms


Pulmonary: Reports: No Symptoms


Cardiovascular: Reports: No Symptoms


Gastrointestinal: Reports: No Symptoms


Genitourinary: Reports: No Symptoms


Musculoskeletal: Reports: No Symptoms


Skin: Reports: No Symptoms


Neurological: Reports: No Symptoms


Psychiatric: Reports: No Symptoms





- General Info


Date of Service: 21





- Patient Data


Vital Signs - Most Recent: 


                                Last Vital Signs











Temp  36.8 C   21 08:00


 


Pulse  81   21 08:00


 


Resp  16   21 08:00


 


BP  121/79   21 08:00


 


Pulse Ox  96   21 08:00











Weight - Most Recent: 104.326 kg


Med Orders - Current: 


                               Current Medications





Albuterol (Albuterol 0.083% 2.5 Mg/3 Ml Neb Soln)  2.5 mg NEB ONETIME PRN


   PRN Reason: Wheezing


Bisacodyl (Bisacodyl 10 Mg Supp)  10 mg RECTAL ONETIME PRN


   PRN Reason: Constipation


Diphenhydramine HCl (Diphenhydramine 50 Mg/Ml Sdv)  25 mg IVPUSH Q6H PRN


   PRN Reason: Itching or Nausea


Docusate Sodium (Docusate Sodium 100 Mg Cap)  100 mg PO BID KAREN


   Last Admin: 06/15/21 20:23 Dose:  100 mg


   Documented by: 


Droperidol (Droperidol 5 Mg/2 Ml Sdv)  0.625 mg IVPUSH ONETIME PRN


   PRN Reason: Nausea/Vomiting


Emollient Ointment (Lanolin 100% Cream 7 Gm Tube)  0 gm TOP ASDIRECTED PRN


   PRN Reason: Sore Nipples


Ephedrine Sulfate (Ephedrine 50 Mg/Ml Sdv)  10 mg IVPUSH Q5M PRN


   PRN Reason: Hypotension


Fentanyl (Fentanyl 100 Mcg/2 Ml Sdv)  50 mcg IVPUSH Q1H PRN


   PRN Reason: Pain (severe 7-10)


Fentanyl (Fentanyl 100 Mcg/2 Ml Sdv)  50 mcg IVPUSH Q5M PRN


   PRN Reason: Pain (mild 1-3)


Hydromorphone HCl (Hydromorphone 2 Mg/Ml Syringe)  0.5 mg IVPUSH Q10M PRN


   PRN Reason: Pain (moderate 4-6)


Lactated Ringer's (Ringers, Lactated)  1,000 mls @ 125 mls/hr IV ASDIRECTED [facility]


   Last Admin: 21 17:21 Dose:  999 mls/hr


   Documented by: 


Oxytocin/Lactated Ringer's (Pitocin In Lr 30 Units/500 Ml)  30 unit in 500 mls @

999 mls/hr IV TITRATE [facility]; Protocol


Tranexamic Acid 1,000 mg/ (Sodium Chloride)  110 mls @ 660 mls/hr IV ONETIME PRN


   PRN Reason: Bleeding


Ibuprofen (Ibuprofen 800 Mg Tab)  800 mg PO Q8H PRN


   PRN Reason: mild pain or fever


   Last Admin: 06/15/21 14:43 Dose:  800 mg


   Documented by: 


Metoclopramide HCl (Metoclopramide 10 Mg/2 Ml Sdv)  10 mg IVPUSH ONETIME PRN


   PRN Reason: Nausea/Vomiting


Misoprostol (Misoprostol 200 Mcg Tab)  1,000 mcg RECTAL ONETIME PRN


   PRN Reason: excessive bleeding


Nalbuphine HCl (Nalbuphine 10 Mg/1 Ml Vial)  5 mg IVPUSH ASDIRECTED PRN


   PRN Reason: Itching


Naloxone HCl (Naloxone 0.4 Mg/Ml Syringe)  0.1 mg IVPUSH ASDIRECTED PRN


   PRN Reason: Respiratory Depression


Nicotine (Nicotine 7 Mg/24 Hr Patch)  7 mg TRDERM DAILY [facility]


   Last Admin: 06/15/21 08:25 Dose:  7 mg


   Documented by: 


Ondansetron HCl (Ondansetron 4 Mg/2 Ml Sdv)  4 mg IVPUSH Q6H PRN


   PRN Reason: Nausea


Ondansetron HCl (Ondansetron 4 Mg/2 Ml Sdv)  4 mg IVPUSH Q4H PRN


   PRN Reason: Nausea/Vomiting


Oxycodone/Acetaminophen (Acetaminophen/Oxycodone 325-5 Mg Tab)  1 tab PO Q4H PRN


   PRN Reason: Pain (severe 7-10)


Oxycodone/Acetaminophen (Acetaminophen/Oxycodone 325-5 Mg Tab)  2 tab PO Q4H PRN


   PRN Reason: Pain (severe 7-10)


   Last Admin: 21 07:53 Dose:  2 tab


   Documented by: 


Oxytocin (Oxytocin 10 Units/1 Ml Sdv)  10 unit IM ASDIRECTED PRN


   PRN Reason: Excessive Vaginal Bleeding


Phenylephrine HCl (Phenylephrine/Normal Saline 100 Mcg/Ml 10 Ml Syringe)  0.1 mg

IVPUSH Q5M PRN


   PRN Reason: Hypotension


Sodium Chloride (Sodium Chloride 0.9% 10 Ml Syringe)  10 ml FLUSH ASDIRECTED PRN


   PRN Reason: Keep Vein Open


Sodium Chloride (Sodium Chloride 0.9% 2.5 Ml Syringe)  2.5 ml FLUSH ASDIRECTED 

PRN


   PRN Reason: Keep Vein Open


Sodium Chloride (Sodium Chloride 0.9% 10 Ml Sdv)  10 ml IV ASDIRECTED PRN


   PRN Reason: IV Use





Discontinued Medications





Cefazolin Sodium (Cefazolin 1 Gm Vial) Confirm Administered Dose 2 gm .ROUTE 

.STK-MED ONE


   Stop: 21 07:20


Citric Acid/Sodium Citrate (Citric Acid/Sodium Citrate Solution 30 Ml Cup)  30 

ml PO ONETIME ONE


   Stop: 21 05:55


Diphenhydramine HCl (Diphenhydramine 50 Mg/Ml Sdv)  12.5 mg IVPUSH Q2H PRN


   PRN Reason: Itching


Fentanyl (Fentanyl 100 Mcg/2 Ml Sdv) Confirm Administered Dose 100 mcg .ROUTE 

.STK-MED ONE


   Stop: 21 07:13


Oxytocin/Sodium Chloride (Oxytocin 30 Unit/500 Ml-Ns)  30 unit in 500 mls @ 250 

mls/hr IV TITRATE [facility]


Lactated Ringer's (Ringers, Lactated)  1,000 mls @ 500 mls/hr IV BOLUS [facility]


   Last Admin: 21 06:31 Dose:  500 mls/hr


   Documented by: 


Sodium Chloride (Normal Saline) Confirm Administered Dose 20 mls @ as directed 

.ROUTE .STK-MED ONE


   Stop: 21 07:20


Ketorolac Tromethamine (Ketorolac 30 Mg/Ml Sdv) Confirm Administered Dose 30 mg 

.ROUTE .STK-MED ONE


   Stop: 21 07:19


Ketorolac Tromethamine (Ketorolac 30 Mg/Ml Sdv)  30 mg IVPUSH Q6H [facility]


   Stop: 06/15/21 09:01


   Last Admin: 06/15/21 08:33 Dose:  30 mg


   Documented by: 


Morphine Sulfate (Morphine Pf 10 Mg/10 Ml Sdv) Confirm Administered Dose 10 mg 

.ROUTE .STK-MED ONE


   Stop: 21 07:14


Morphine Sulfate (Morphine 2 Mg/Ml Syringe)  2 mg IVPUSH Q10M PRN


   PRN Reason: Pain (severe 7-10)


Naloxone HCl (Naloxone 0.4 Mg/Ml Syringe)  0.1 mg IVPUSH ONETIME PRN


   PRN Reason: Respiratory Depression


   Stop: 06/15/21 07:34


Octyl Cyanoacrylate (Octyl 2-Cyanoacrylate 1 Tube) Confirm Administered Dose 1 

applic .ROUTE .STK-MED ONE


   Stop: 21 07:19


Ondansetron HCl (Ondansetron 4 Mg/2 Ml Sdv) Confirm Administered Dose 4 mg .RO

KADEN .STK-MED ONE


   Stop: 21 07:19


Ondansetron HCl (Ondansetron 4 Mg/2 Ml Sdv)  4 mg IVPUSH ONETIME PRN


   PRN Reason: Nausea/Vomiting


Oxycodone/Acetaminophen (Acetaminophen/Oxycodone 325-5 Mg Tab)  2 tab PO Q6H PRN


   PRN Reason: Pain (moderate 4-6)


Oxytocin (Oxytocin 10 Units/1 Ml Sdv) Confirm Administered Dose 30 unit .ROUTE 

.STK-MED ONE


   Stop: 21 07:19











- Infant Interaction


Infant Disposition, Postpartum:  in Room with Family


Infant Interaction: Holding Infant


Infant Feeding:  Infant; Nursed Well


Support Person: Significant Other





- Postpartum Recovery Exam


Fundal Tone: Firm


Fundal Level: 2 Fingerbreadths Below Umbilicus


Fundal Placement: Midline


Lochia Amount: Scant


Lochia Color: Rubra/Red


Perineum Description: Intact, Minimal Bruising/Swelling


Episiotomy/Laceration: None


Bladder Status: Voiding





- Exam


General: Alert


Neck: Supple


Lungs: Normal Respiratory Effort


Cardiovascular: Regular Rate, Regular Rhythm


GI/Abdominal Exam: Soft, Non-Tender, No Distention


Extremities: Normal Range of Motion, Non-Tender, No Pedal Edema


Skin: Warm, Dry, Intact


Wound/Incisions: Healing Well


Neurological: No New Focal Deficit


Psy/Mental Status: Alert, Normal Affect, Normal Mood





- Problem List & Annotations


(1)  delivery delivered


SNOMED Code(s): 914897466


   Code(s): O82 - ENCOUNTER FOR  DELIVERY WITHOUT INDICATION   Status: 

Acute   Current Visit: No   





- Problem List Review


Problem List Initiated/Reviewed/Updated: Yes





- My Orders


Last 24 Hours: 


My Active Orders





21 08:30


Ready for Discharge [RC] PER UNIT ROUTINE 














- Assessment


Assessment:: 


PPD#2 after repeat low transverse .  Stable, minimal lochia, pain is 

well controlled. She is breastfeeding well, would like to be discharged. 








- Plan


Plan:: 


Discharge instructions reviewed.  Dismiss to home today

## 2021-07-22 NOTE — EDM.PDOC
ED HPI GENERAL MEDICAL PROBLEM





- General


Chief Complaint: Respiratory Problem


Stated Complaint: CHEST PAIN


Time Seen by Provider: 21 02:32





- History of Present Illness


INITIAL COMMENTS - FREE TEXT/NARRATIVE: 





HISTORY AND PHYSICAL:





History of present illness:


This is a 20-year-old female with no significant past medical history who 

presents ER today secondary to severe pain to her mid epigastric and right upper

quadrant area that woke her up from sleep with some discomfort in her mid 

sternal region.  Patient denies any recent fevers, shakes, chills.  Patient 

reports nausea with no vomiting or diarrhea.  Patient has any dysuria, 

frequency, urgency.  Patient denies any shortness of breath or diaphoresis.  

Patient has any pain rating to her arms or back.  Patient reports that she ate





Review of systems: 


As per history of present illness and below otherwise all systems reviewed and 

negative.





Past medical history: 


As per history of present illness and as reviewed below otherwise 

noncontributory.





Surgical history: 


As per history of present illness and as reviewed below otherwise 

noncontributory.





Social history: 


No reported history of drug abuse.





Family history: 


As per history of present illness and as reviewed below otherwise 

noncontributory.





Physical exam:





This patient was seen and evaluated during the  SARS-CoV-2 novel coronavirus

pandemic period.  Community viral transmission is ongoing at time of this 

encounter and the emergency department is operating under pandemic response 

procedures.





Constitutional: Patient is oriented to person, place, and time.  Appears well-

developed and well-nourished.  No distress.


 HEENT: Moist mucous membranes


 Head: Normocephalic and atraumatic


 Eyes: Right eye exhibits no discharge.  Left eye exhibits no discharge.  No 

scleral icterus


 Neck: Normal range of motion.  No tracheal deviation present.


 Cardiovascular: Normal rate and regular rhythm.


 Pulmonary: Effort normal, no respiratory distress.


Abd:  Soft, nondistended, no rebound/guarding, no psoas or obturator signs, no 

tenderness at Mcberney's point, no Castro's sign.  Pt does not present with an 

exam that would be consistent with an acute surgical abdomen at this time, 

tenderness palpation diffusely throughout her upper abdomen with pain greatest 

in the right upper quadrant.


 Musculoskeletal: Normal range of motion


 Neurologic: Alert and oriented to person, place and time.


 Skin: Pink, warm and dry.  


 Psychiatric: Normal mood and affect.  Behavior is normal.  Judgment and thought

content normal.


 Nursing note and vital signs have been reviewed











Diagnostics:


CBC, CMP, lipase all within normal limits.





Therapeutics:


Bentyl, GI cocktail, Zofran, Toradol.  Patient reevaluated several minutes after

receiving the GI cocktail and her medications and she reports that she feels 

100% better and is resting in bed laughing and joking around at this time.  She 

looks 100% improved and upon her presentation.





Assessment and plan:


This is a 20-year-old female who presents ER today complaining of abdominal pain

but on exam pain is greatest in the right upper quadrant that started earlier 

this evening that woke her from sleep.  Patient's pain appears to be consistent 

with biliary colic.  Patient be given Bentyl, GI cocktail, Toradol and will be 

reevaluated after labs including CBC and CMP.  Unfortunately at this time, we do

not have the access to elective ultrasonography in the evening.





Patient's presentation appear to be consistent with biliary colic and the fact 

that she did not improve with the Toradol, Zofran, GI cocktail Bentyl leads me 

more to believe that that was the case.  Patient be given for referral for 

general surgery and a prescription for Bentyl, ibuprofen and Zofran to assist 

her if the symptoms should return.  I have discussed with her dietary restrictio

ns.





Reassessment at the time of disposition demonstrates that the patient is in no 

acute distress.  The patient has remained stable throughout the entire ED visit 

and is without objective evidence for acute process requiring urgent 

intervention or hospitalization. The patient is stable for discharge, counseling

is provided as documented above, discussed symptomatic treatment and specific 

conditions for return.





I have spoken with the patient/caregiver and discussed todays findings, in 

addition to providing specific details for the plan of care. Questions are 

answered and there is agreement with the plan.





Definitive disposition and diagnosis as appropriate pending reevaluation and 

review of above.








  ** Upper Abdomen


Pain Score (Numeric/FACES): 8





- Related Data


                                    Allergies











Allergy/AdvReac Type Severity Reaction Status Date / Time


 


No Known Allergies Allergy   Verified 21 02:23











Home Meds: 


                                    Home Meds





Omeprazole 40 mg PO DAILY 21 [History]


Ibuprofen [Motrin] 800 mg PO Q8H PRN #30 tablet 06/15/21 [Rx]


Cefdinir 300 mg PO Q12HR #20 capsule 21 [Rx]


Dicyclomine [Bentyl] 20 mg PO QIDACANDBED #20 tab 21 [Rx]


Ibuprofen 600 mg PO Q6HR PRN #30 tablet 21 [Rx]


Ondansetron [Zofran ODT] 4 mg PO Q6H PRN #12 tab.dis 21 [Rx]











Past Medical History





- Past Health History


Medical/Surgical History: Denies Medical/Surgical History


HEENT History: Reports: None, Other (See Below)


Cardiovascular History: Reports: None


Respiratory History: Reports: None


Gastrointestinal History: Reports: None


Genitourinary History: Reports: None


OB/GYN History: Reports: Pregnancy


Other OB/GYN History:  section for fetal distress


Musculoskeletal History: Reports: None


Other Musculoskeletal History: hx fx leg


Neurological History: Reports: None


Psychiatric History: Reports: Depression


Endocrine/Metabolic History: Reports: None


Hematologic History: Reports: None


Immunologic History: Reports: None


Oncologic (Cancer) History: Reports: None


Dermatologic History: Reports: None





- Infectious Disease History


Infectious Disease History: Reports: Influenza





- Past Surgical History


Head Surgeries/Procedures: Reports: None


HEENT Surgical History: Reports: None


Cardiovascular Surgical History: Reports: None


Respiratory Surgical History: Reports: None


GI Surgical History: Reports: None


Female  Surgical History: Reports: None,  Section


Endocrine Surgical History: Reports: None


Neurological Surgical History: Reports: None


Musculoskeletal Surgical History: Reports: None


Oncologic Surgical History: Reports: None





Social & Family History





- Family History


Family Medical History: No Pertinent Family History


HEENT: Reports: None


Cardiac: Reports: None


Respiratory: Reports: None


GI: Reports: None


: Reports: None


OBGYN: Reports: None


Musculoskeletal: Reports: None


Neurological: Reports: None


Psychiatric: Reports: None


Endocrine/Metabolic: Reports: None


Hematologic: Reports: None


Immunologic: Reports: None


Dermatologic: Reports: None


Oncologic: Reports: None





- Caffeine Use


Caffeine Use: Reports: Energy Drinks, Soda





- Recreational Drug Use


Recreational Drug Use: No





ED ROS GENERAL





- Review of Systems


Review Of Systems: See Below





ED EXAM, GENERAL





- Physical Exam


Exam: See Below





Course





- Vital Signs


Last Recorded V/S: 


                                Last Vital Signs











Temp  96.9 F   21 02:24


 


Pulse  54 L  21 04:20


 


Resp  17   21 04:20


 


BP  121/63   21 04:20


 


Pulse Ox  97   21 04:20














- Orders/Labs/Meds


Labs: 


                                Laboratory Tests











  21 Range/Units





  02:25 02:25 03:45 


 


WBC  11.81 H    (4.0-11.0)  K/uL


 


RBC  4.90    (4.30-5.90)  M/uL


 


Hgb  14.2    (12.0-16.0)  g/dL


 


Hct  42.3    (36.0-46.0)  %


 


MCV  86.3    (80.0-98.0)  fL


 


MCH  29.0    (27.0-32.0)  pg


 


MCHC  33.6    (31.0-37.0)  g/dL


 


RDW Std Deviation  46.3    (28.0-62.0)  fl


 


RDW Coeff of Nicolette  15    (11.0-15.0)  %


 


Plt Count  370    (150-400)  K/uL


 


MPV  10.30    (7.40-12.00)  fL


 


Neut % (Auto)  46.2 L    (48.0-80.0)  %


 


Lymph % (Auto)  40.2 H    (16.0-40.0)  %


 


Mono % (Auto)  6.4    (0.0-15.0)  %


 


Eos % (Auto)  6.9    (0.0-7.0)  %


 


Baso % (Auto)  0.3    (0.0-1.5)  %


 


Neut # (Auto)  5.5    (1.4-5.7)  K/uL


 


Lymph # (Auto)  4.8 H    (0.6-2.4)  K/uL


 


Mono # (Auto)  0.8    (0.0-0.8)  K/uL


 


Eos # (Auto)  0.8 H    (0.0-0.7)  K/uL


 


Baso # (Auto)  0.0    (0.0-0.1)  K/uL


 


Nucleated RBC %  0.0    /100WBC


 


Nucleated RBCs #  0    K/uL


 


Sodium   145   (136-145)  mmol/L


 


Potassium   3.7   (3.5-5.1)  mmol/L


 


Chloride   109 H   ()  mmol/L


 


Carbon Dioxide   23.6   (21.0-32.0)  mmol/L


 


BUN   10   (7.0-18.0)  mg/dL


 


Creatinine   0.9   (0.6-1.0)  mg/dL


 


Est Cr Clr Drug Dosing   103.33   mL/min


 


Estimated GFR (MDRD)   > 60.0   ml/min


 


Glucose   115 H   ()  mg/dL


 


Calcium   9.2   (8.5-10.1)  mg/dL


 


Total Bilirubin   0.2   (0.2-1.0)  mg/dL


 


AST   20   (15-37)  IU/L


 


ALT   37   (14-63)  IU/L


 


Alkaline Phosphatase   81   ()  U/L


 


Total Protein   7.3   (6.4-8.2)  g/dL


 


Albumin   3.8   (3.4-5.0)  g/dL


 


Globulin   3.5   (2.6-4.0)  g/dL


 


Albumin/Globulin Ratio   1.1   (0.9-1.6)  


 


Lipase   236   ()  U/L


 


Urine Color    YELLOW  


 


Urine Appearance    SLT CLOUDY  


 


Urine pH    6.0  (5.0-8.0)  


 


Ur Specific Gravity    >= 1.030  (1.001-1.035)  


 


Urine Protein    30 H  (NEGATIVE)  mg/dL


 


Urine Glucose (UA)    NEGATIVE  (NEGATIVE)  mg/dL


 


Urine Ketones    NEGATIVE  (NEGATIVE)  mg/dL


 


Urine Occult Blood    MODERATE H  (NEGATIVE)  


 


Urine Nitrite    NEGATIVE  (NEGATIVE)  


 


Urine Bilirubin    SMALL H  (NEGATIVE)  


 


Urine Ictotest    NEGATIVE  


 


Urine Urobilinogen    0.2  (<2.0)  EU/dL


 


Ur Leukocyte Esterase    NEGATIVE  (NEGATIVE)  


 


Urine RBC    1-5  (0-2/HPF)  


 


Urine WBC    10-20  (0-5/HPF)  


 


Ur Epithelial Cells    MANY  (NONE-FEW)  


 


Urine Bacteria    1+ H  (NEGATIVE)  


 


Urinalysis Comment      


 


Urine HCG, Qual     (NEGATIVE)  














  21 Range/Units





  03:45 


 


WBC   (4.0-11.0)  K/uL


 


RBC   (4.30-5.90)  M/uL


 


Hgb   (12.0-16.0)  g/dL


 


Hct   (36.0-46.0)  %


 


MCV   (80.0-98.0)  fL


 


MCH   (27.0-32.0)  pg


 


MCHC   (31.0-37.0)  g/dL


 


RDW Std Deviation   (28.0-62.0)  fl


 


RDW Coeff of Nicolette   (11.0-15.0)  %


 


Plt Count   (150-400)  K/uL


 


MPV   (7.40-12.00)  fL


 


Neut % (Auto)   (48.0-80.0)  %


 


Lymph % (Auto)   (16.0-40.0)  %


 


Mono % (Auto)   (0.0-15.0)  %


 


Eos % (Auto)   (0.0-7.0)  %


 


Baso % (Auto)   (0.0-1.5)  %


 


Neut # (Auto)   (1.4-5.7)  K/uL


 


Lymph # (Auto)   (0.6-2.4)  K/uL


 


Mono # (Auto)   (0.0-0.8)  K/uL


 


Eos # (Auto)   (0.0-0.7)  K/uL


 


Baso # (Auto)   (0.0-0.1)  K/uL


 


Nucleated RBC %   /100WBC


 


Nucleated RBCs #   K/uL


 


Sodium   (136-145)  mmol/L


 


Potassium   (3.5-5.1)  mmol/L


 


Chloride   ()  mmol/L


 


Carbon Dioxide   (21.0-32.0)  mmol/L


 


BUN   (7.0-18.0)  mg/dL


 


Creatinine   (0.6-1.0)  mg/dL


 


Est Cr Clr Drug Dosing   mL/min


 


Estimated GFR (MDRD)   ml/min


 


Glucose   ()  mg/dL


 


Calcium   (8.5-10.1)  mg/dL


 


Total Bilirubin   (0.2-1.0)  mg/dL


 


AST   (15-37)  IU/L


 


ALT   (14-63)  IU/L


 


Alkaline Phosphatase   ()  U/L


 


Total Protein   (6.4-8.2)  g/dL


 


Albumin   (3.4-5.0)  g/dL


 


Globulin   (2.6-4.0)  g/dL


 


Albumin/Globulin Ratio   (0.9-1.6)  


 


Lipase   ()  U/L


 


Urine Color   


 


Urine Appearance   


 


Urine pH   (5.0-8.0)  


 


Ur Specific Gravity   (1.001-1.035)  


 


Urine Protein   (NEGATIVE)  mg/dL


 


Urine Glucose (UA)   (NEGATIVE)  mg/dL


 


Urine Ketones   (NEGATIVE)  mg/dL


 


Urine Occult Blood   (NEGATIVE)  


 


Urine Nitrite   (NEGATIVE)  


 


Urine Bilirubin   (NEGATIVE)  


 


Urine Ictotest   


 


Urine Urobilinogen   (<2.0)  EU/dL


 


Ur Leukocyte Esterase   (NEGATIVE)  


 


Urine RBC   (0-2/HPF)  


 


Urine WBC   (0-5/HPF)  


 


Ur Epithelial Cells   (NONE-FEW)  


 


Urine Bacteria   (NEGATIVE)  


 


Urinalysis Comment   


 


Urine HCG, Qual  NEGATIVE  (NEGATIVE)  











Meds: 


Medications














Discontinued Medications














Generic Name Dose Route Start Last Admin





  Trade Name Freq  PRN Reason Stop Dose Admin


 


Al Hydroxide/Mg Hydroxide 15  0 ml  21 02:35  21 02:44





  ml/ Lidocaine HCl 5 ml  PO  21 02:36  15 each





  ONETIME ONE   Administration


 


Dicyclomine HCl  20 mg  21 02:35  21 02:43





  Dicyclomine 10 Mg Cap  PO  21 02:36  20 mg





  ONETIME ONE   Administration


 


Fentanyl  50 mcg  21 03:34  21 03:42





  Fentanyl 50 Mcg/Ml Sdv  IVPUSH  21 03:35  50 mcg





  ONETIME ONE   Administration


 


Sodium Chloride  1,000 mls @ 999 mls/hr  21 02:35  21 02:43





  Normal Saline  IV  21 03:35  999 mls/hr





  .Bolus ONE   Administration


 


Ketorolac Tromethamine  30 mg  21 02:35  21 02:44





  Ketorolac 30 Mg/Ml Sdv  IVPUSH  21 02:36  30 mg





  ONETIME ONE   Administration


 


Ondansetron HCl  4 mg  21 02:35  21 02:44





  Ondansetron 4 Mg/2 Ml Sdv  IVPUSH  21 02:36  4 mg





  ONETIME ONE   Administration


 


Sodium Chloride  10 ml  21 02:35  21 02:43





  Sodium Chloride 0.9% 10 Ml Syringe  FLUSH   10 ml





  ASDIRECTED PRN   Administration





  Keep Vein Open  


 


Sodium Chloride  2.5 ml  21 02:35  21 02:43





  Sodium Chloride 0.9% 2.5 Ml Syringe  FLUSH   2.5 ml





  ASDIRECTED PRN   Administration





  Keep Vein Open  














Departure





- Departure


Time of Disposition: 03:03


Disposition: Home, Self-Care 01


Condition: Good


Clinical Impression: 


 Biliary colic symptom, UTI (urinary tract infection)








- Discharge Information


Prescriptions: 


Dicyclomine [Bentyl] 20 mg PO QIDACANDBED #20 tab


Cefdinir 300 mg PO Q12HR #20 capsule


Ibuprofen 600 mg PO Q6HR PRN #30 tablet


 PRN Reason: Pain


Ondansetron [Zofran ODT] 4 mg PO Q6H PRN #12 tab.dis


 PRN Reason: Nausea


Instructions:  Biliary Colic, Adult, Urinary Tract Infection, Adult


Referrals: 


Nicola Tran MD [Primary Care Provider] - 


Forms:  ED Department Discharge


Additional Instructions: 


You were seen and evaluated in ER today secondary to signs and symptoms 

consistent with biliary colic.  Your blood tests were all within normal limits. 

You were given the phone number for the surgery clinic in case you have further 

episodes of pain like this in off to have your gallbladder removed.  This is 

called a cholecystectomy which is performed electively by a general surgeon.  In

the meantime, I would recommend that you avoid any greasy or fatty foods and 

maintain a bland diet for the next several days including toast, cookies, 

crackers, fruits and vegetables.





[facility] - General Surgery


 Professional [address], [address]


[place] [address]


Phone: [phone]








The following information is given to patients seen in the emergency department 

who are being discharged to home. This information is to outline your options 

for follow-up care. We provide all patients seen in our emergency department 

with a follow-up referral.





The need for follow-up, as well as the timing and circumstances, are variable 

depending upon the specifics of your emergency department visit.





If you don't have a primary care physician on staff, we will provide you with a 

referral. We always advise you to contact your personal physician following an 

emergency department visit to inform them of the circumstance of the visit and 

for follow-up with them and/or the need for any referrals to a consulting 

specialist.





The emergency department will also refer you to a specialist when appropriate. 

This referral assures that you have the opportunity for follow-up care with a 

specialist. All of these measure are taken in an effort to provide you with 

optimal care, which includes your follow-up.





Under all circumstances we always encourage you to contact your private 

physician who remains a resource for coordinating your care. When calling for 

follow-up care, please make the office aware that this follow-up is from your 

recent emergency room visit. If for any reason you are refused follow-up, please

contact the [facility] Emergency Department

at [phone] and asked to speak to the emergency department charge nurse.





[facility] - Primary Care


[address] [place] [id number]


Phone: [phone]


Fax: [phone]








[address] [address]


Phone: [phone]


Fax: [phone]








Sepsis Event Note (ED)





- Evaluation


Sepsis Screening Result: No Definite Risk

## 2023-04-10 NOTE — PCM.PN
- General Info


Date of Service: 06/14/21


Functional Status: Reports: Pain Controlled


Pain Score: 1





- Review of Systems


General: Reports: No Symptoms


HEENT: Reports: No Symptoms


Pulmonary: Reports: No Symptoms


Cardiovascular: Reports: No Symptoms


Gastrointestinal: Reports: No Symptoms


Genitourinary: Reports: No Symptoms


Musculoskeletal: Reports: No Symptoms


Skin: Reports: No Symptoms


Neurological: Reports: No Symptoms


Psychiatric: Reports: No Symptoms





- Patient Data


Weight - Most Recent: 230 lb


I&O - Last 24 Hours: 


                                 Intake & Output











 06/13/21 06/14/21 06/14/21





 22:59 06:59 14:59


 


Intake Total   1200


 


Balance   1200











Lab Results Last 24 Hours: 


                         Laboratory Results - last 24 hr











  06/14/21 06/14/21 06/14/21 Range/Units





  05:45 05:45 08:12 


 


WBC  10.33    (4.0-11.0)  K/uL


 


RBC  4.54    (4.30-5.90)  M/uL


 


Hgb  13.0    (12.0-16.0)  g/dL


 


Hct  39.6    (36.0-46.0)  %


 


MCV  87.2    (80.0-98.0)  fL


 


MCH  28.6    (27.0-32.0)  pg


 


MCHC  32.8    (31.0-37.0)  g/dL


 


RDW Std Deviation  49.0    (28.0-62.0)  fl


 


RDW Coeff of Nicolette  15    (11.0-15.0)  %


 


Plt Count  251    (150-400)  K/uL


 


MPV  11.30    (7.40-12.00)  fL


 


Nucleated RBC %  0.0    /100WBC


 


Nucleated RBCs #  0    K/uL


 


Cord ABG pH    7.299  (7.18-7.38)  


 


Cord ABG Base Excess    -1.4 H  (-10--2)  


 


Cord VBG pH    7.326  (7.25-7.45)  


 


Cord VBG Base Excess    -0.7 H  (-10--2)  


 


Blood Type   A POSITIVE   


 


Antibody Screen   NEGATIVE   











Med Orders - Current: 


                               Current Medications





Albuterol (Albuterol 0.083% 2.5 Mg/3 Ml Neb Soln)  2.5 mg NEB ONETIME PRN


   PRN Reason: Wheezing


Bisacodyl (Bisacodyl 10 Mg Supp)  10 mg RECTAL ONETIME PRN


   PRN Reason: Constipation


Diphenhydramine HCl (Diphenhydramine 50 Mg/Ml Sdv)  12.5 mg IVPUSH Q2H PRN


   PRN Reason: Itching


Diphenhydramine HCl (Diphenhydramine 50 Mg/Ml Sdv)  25 mg IVPUSH Q6H PRN


   PRN Reason: Itching or Nausea


Docusate Sodium (Docusate Sodium 100 Mg Cap)  100 mg PO BID KAREN


Droperidol (Droperidol 5 Mg/2 Ml Sdv)  0.625 mg IVPUSH ONETIME PRN


   PRN Reason: Nausea/Vomiting


Emollient Ointment (Lanolin 100% Cream 7 Gm Tube)  0 gm TOP ASDIRECTED PRN


   PRN Reason: Sore Nipples


Ephedrine Sulfate (Ephedrine 50 Mg/Ml Sdv)  10 mg IVPUSH Q5M PRN


   PRN Reason: Hypotension


Fentanyl (Fentanyl 100 Mcg/2 Ml Sdv)  50 mcg IVPUSH Q1H PRN


   PRN Reason: Pain (severe 7-10)


Fentanyl (Fentanyl 100 Mcg/2 Ml Sdv)  50 mcg IVPUSH Q5M PRN


   PRN Reason: Pain (mild 1-3)


Hydromorphone HCl (Hydromorphone 2 Mg/Ml Syringe)  0.5 mg IVPUSH Q10M PRN


   PRN Reason: Pain (moderate 4-6)


Lactated Ringer's (Ringers, Lactated)  1,000 mls @ 125 mls/hr IV ASDIRECTED [facility]


Oxytocin/Lactated Ringer's (Pitocin In Lr 30 Units/500 Ml)  30 unit in 500 mls @

999 mls/hr IV TITRATE KAREN; Protocol


Tranexamic Acid 1,000 mg/ (Sodium Chloride)  110 mls @ 660 mls/hr IV ONETIME PRN


   PRN Reason: Bleeding


Ibuprofen (Ibuprofen 800 Mg Tab)  800 mg PO Q8H PRN


   PRN Reason: mild pain or fever


Ketorolac Tromethamine (Ketorolac 30 Mg/Ml Sdv)  30 mg IVPUSH Q6H [facility]


   Stop: 06/15/21 09:01


Metoclopramide HCl (Metoclopramide 10 Mg/2 Ml Sdv)  10 mg IVPUSH ONETIME PRN


   PRN Reason: Nausea/Vomiting


Misoprostol (Misoprostol 200 Mcg Tab)  1,000 mcg RECTAL ONETIME PRN


   PRN Reason: excessive bleeding


Morphine Sulfate (Morphine 2 Mg/Ml Syringe)  2 mg IVPUSH Q10M PRN


   PRN Reason: Pain (severe 7-10)


Nalbuphine HCl (Nalbuphine 10 Mg/1 Ml Vial)  5 mg IVPUSH ASDIRECTED PRN


   PRN Reason: Itching


Naloxone HCl (Naloxone 0.4 Mg/Ml Syringe)  0.1 mg IVPUSH ONETIME PRN


   PRN Reason: Respiratory Depression


   Stop: 06/15/21 07:34


Naloxone HCl (Naloxone 0.4 Mg/Ml Syringe)  0.1 mg IVPUSH ASDIRECTED PRN


   PRN Reason: Respiratory Depression


Ondansetron HCl (Ondansetron 4 Mg/2 Ml Sdv)  4 mg IVPUSH Q6H PRN


   PRN Reason: Nausea


Ondansetron HCl (Ondansetron 4 Mg/2 Ml Sdv)  4 mg IVPUSH ONETIME PRN


   PRN Reason: Nausea/Vomiting


Ondansetron HCl (Ondansetron 4 Mg/2 Ml Sdv)  4 mg IVPUSH Q4H PRN


   PRN Reason: Nausea/Vomiting


Oxycodone/Acetaminophen (Acetaminophen/Oxycodone 325-5 Mg Tab)  2 tab PO Q6H PRN


   PRN Reason: Pain (moderate 4-6)


Oxycodone/Acetaminophen (Acetaminophen/Oxycodone 325-5 Mg Tab)  1 tab PO Q4H PRN


   PRN Reason: Pain (severe 7-10)


Oxycodone/Acetaminophen (Acetaminophen/Oxycodone 325-5 Mg Tab)  2 tab PO Q4H PRN


   PRN Reason: Pain (severe 7-10)


Oxytocin (Oxytocin 10 Units/1 Ml Sdv)  10 unit IM ASDIRECTED PRN


   PRN Reason: Excessive Vaginal Bleeding


Phenylephrine HCl (Phenylephrine/Normal Saline 100 Mcg/Ml 10 Ml Syringe)  0.1 mg

IVPUSH Q5M PRN


   PRN Reason: Hypotension


Sodium Chloride (Sodium Chloride 0.9% 10 Ml Syringe)  10 ml FLUSH ASDIRECTED PRN


   PRN Reason: Keep Vein Open


Sodium Chloride (Sodium Chloride 0.9% 2.5 Ml Syringe)  2.5 ml FLUSH ASDIRECTED 

PRN


   PRN Reason: Keep Vein Open


Sodium Chloride (Sodium Chloride 0.9% 10 Ml Sdv)  10 ml IV ASDIRECTED PRN


   PRN Reason: IV Use





Discontinued Medications





Cefazolin Sodium (Cefazolin 1 Gm Vial) Confirm Administered Dose 2 gm .ROUTE 

.STK-MED ONE


   Stop: 06/14/21 07:20


Citric Acid/Sodium Citrate (Citric Acid/Sodium Citrate Solution 30 Ml Cup)  30 

ml PO ONETIME ONE


   Stop: 06/14/21 05:55


Fentanyl (Fentanyl 100 Mcg/2 Ml Sdv) Confirm Administered Dose 100 mcg .ROUTE 

.STK-MED ONE


   Stop: 06/14/21 07:13


Oxytocin/Sodium Chloride (Oxytocin 30 Unit/500 Ml-Ns)  30 unit in 500 mls @ 250 

mls/hr IV TITRATE KAREN


Lactated Ringer's (Ringers, Lactated)  1,000 mls @ 500 mls/hr IV BOLUS KAREN


   Last Admin: 06/14/21 06:31 Dose:  500 mls/hr


   Documented by: 


Sodium Chloride (Normal Saline) Confirm Administered Dose 20 mls @ as directed 

.ROUTE .STK-MED ONE


   Stop: 06/14/21 07:20


Ketorolac Tromethamine (Ketorolac 30 Mg/Ml Sdv) Confirm Administered Dose 30 mg 

.ROUTE .STK-MED ONE


   Stop: 06/14/21 07:19


Morphine Sulfate (Morphine Pf 10 Mg/10 Ml Sdv) Confirm Administered Dose 10 mg 

.ROUTE .STK-MED ONE


   Stop: 06/14/21 07:14


Octyl Cyanoacrylate (Octyl 2-Cyanoacrylate 1 Tube) Confirm Administered Dose 1 

applic .ROUTE .STK-MED ONE


   Stop: 06/14/21 07:19


Ondansetron HCl (Ondansetron 4 Mg/2 Ml Sdv) Confirm Administered Dose 4 mg 

.ROUTE .STK-MED ONE


   Stop: 06/14/21 07:19


Oxytocin (Oxytocin 10 Units/1 Ml Sdv) Confirm Administered Dose 30 unit .ROUTE 

.STK-MED ONE


   Stop: 06/14/21 07:19











- Exam


General: Alert, Oriented


HEENT: Pupils Equal, Pupils Reactive, EOMI, Mucous Membr. Moist/Pink


Neck: Supple


Lungs: Clear to Auscultation, Normal Respiratory Effort


Cardiovascular: Regular Rate, Regular Rhythm


GI/Abdominal Exam: Normal Bowel Sounds, Soft, Non-Tender, No Organomegaly, No 

Distention, No Abnormal Bruit, No Mass, Pelvis Stable


 (Female) Exam: Normal External Exam, Normal Speculum Exam, Normal Bimanual 

Exam


Back Exam: Normal Inspection, Full Range of Motion


Extremities: Normal Inspection, Normal Range of Motion, Non-Tender, No Pedal 

Edema, Normal Capillary Refill


Skin: Warm, Dry, Intact


Wound/Incisions: Healing Well


Neurological: No New Focal Deficit


Psy/Mental Status: Alert, Normal Affect, Normal Mood





- Patient Data


Lab Results Last 24 hrs: 


                         Laboratory Results - last 24 hr











  06/14/21 06/14/21 06/14/21 Range/Units





  05:45 05:45 08:12 


 


WBC  10.33    (4.0-11.0)  K/uL


 


RBC  4.54    (4.30-5.90)  M/uL


 


Hgb  13.0    (12.0-16.0)  g/dL


 


Hct  39.6    (36.0-46.0)  %


 


MCV  87.2    (80.0-98.0)  fL


 


MCH  28.6    (27.0-32.0)  pg


 


MCHC  32.8    (31.0-37.0)  g/dL


 


RDW Std Deviation  49.0    (28.0-62.0)  fl


 


RDW Coeff of Nicolette  15    (11.0-15.0)  %


 


Plt Count  251    (150-400)  K/uL


 


MPV  11.30    (7.40-12.00)  fL


 


Nucleated RBC %  0.0    /100WBC


 


Nucleated RBCs #  0    K/uL


 


Cord ABG pH    7.299  (7.18-7.38)  


 


Cord ABG Base Excess    -1.4 H  (-10--2)  


 


Cord VBG pH    7.326  (7.25-7.45)  


 


Cord VBG Base Excess    -0.7 H  (-10--2)  


 


Blood Type   A POSITIVE   


 


Antibody Screen   NEGATIVE   











Result Diagrams: 


                                 06/15/21 05:20








Sepsis Event Note





- Evaluation


Sepsis Screening Result: No Definite Risk





- Problem List Review


Problem List Initiated/Reviewed/Updated: No Humira Counseling:  I discussed with the patient the risks of adalimumab including but not limited to myelosuppression, immunosuppression, autoimmune hepatitis, demyelinating diseases, lymphoma, and serious infections.  The patient understands that monitoring is required including a PPD at baseline and must alert us or the primary physician if symptoms of infection or other concerning signs are noted.